# Patient Record
Sex: FEMALE | Race: BLACK OR AFRICAN AMERICAN | NOT HISPANIC OR LATINO | Employment: FULL TIME | URBAN - METROPOLITAN AREA
[De-identification: names, ages, dates, MRNs, and addresses within clinical notes are randomized per-mention and may not be internally consistent; named-entity substitution may affect disease eponyms.]

---

## 2022-04-11 ENCOUNTER — HISTORICAL (OUTPATIENT)
Dept: ADMINISTRATIVE | Facility: HOSPITAL | Age: 22
End: 2022-04-11

## 2022-04-28 VITALS
HEIGHT: 60 IN | SYSTOLIC BLOOD PRESSURE: 103 MMHG | BODY MASS INDEX: 24.68 KG/M2 | DIASTOLIC BLOOD PRESSURE: 69 MMHG | OXYGEN SATURATION: 98 % | WEIGHT: 125.69 LBS

## 2022-11-22 ENCOUNTER — HOSPITAL ENCOUNTER (EMERGENCY)
Facility: HOSPITAL | Age: 22
Discharge: HOME | End: 2022-11-22
Attending: STUDENT IN AN ORGANIZED HEALTH CARE EDUCATION/TRAINING PROGRAM
Payer: MEDICAID

## 2022-11-22 VITALS
DIASTOLIC BLOOD PRESSURE: 64 MMHG | RESPIRATION RATE: 22 BRPM | WEIGHT: 125.69 LBS | BODY MASS INDEX: 24.68 KG/M2 | HEART RATE: 112 BPM | TEMPERATURE: 99 F | OXYGEN SATURATION: 100 % | SYSTOLIC BLOOD PRESSURE: 109 MMHG | HEIGHT: 60 IN

## 2022-11-22 DIAGNOSIS — J10.1 INFLUENZA A: Primary | ICD-10-CM

## 2022-11-22 LAB
B-HCG UR QL: NEGATIVE
CTP QC/QA: YES
FLUAV AG UPPER RESP QL IA.RAPID: DETECTED
FLUBV AG UPPER RESP QL IA.RAPID: NOT DETECTED
SARS-COV-2 RNA RESP QL NAA+PROBE: NOT DETECTED
STREP A PCR (OHS): NOT DETECTED

## 2022-11-22 PROCEDURE — 0240U COVID/FLU A&B PCR: CPT | Performed by: PHYSICIAN ASSISTANT

## 2022-11-22 PROCEDURE — 99284 EMERGENCY DEPT VISIT MOD MDM: CPT | Mod: 25

## 2022-11-22 PROCEDURE — 81025 URINE PREGNANCY TEST: CPT | Performed by: PHYSICIAN ASSISTANT

## 2022-11-22 PROCEDURE — 87651 STREP A DNA AMP PROBE: CPT | Performed by: PHYSICIAN ASSISTANT

## 2022-11-22 RX ORDER — PROMETHAZINE HYDROCHLORIDE AND DEXTROMETHORPHAN HYDROBROMIDE 6.25; 15 MG/5ML; MG/5ML
5 SYRUP ORAL EVERY 6 HOURS PRN
Qty: 118 ML | Refills: 0 | OUTPATIENT
Start: 2022-11-22 | End: 2023-01-30

## 2022-11-22 NOTE — ED PROVIDER NOTES
Encounter Date: 11/22/2022     History     Chief Complaint   Patient presents with    Sore Throat     PT W CO SORE THROAT X 3 DAYS.  CO PAIN W SWALLOWING.  NO EATING DUE TO NAUSEA.      Patient with no known pmhx presents today c/o sore throat, productive cough, and nausea for 4 days. Also endorses chills. Throat pain is worse with swallowing. Patient says cough is very productive.     The history is provided by the patient. No  was used.   Cough  The current episode started several days ago. The problem occurs constantly. The problem has been unchanged. The cough is Productive of sputum. There has been no fever. Associated symptoms include chills and sore throat. Pertinent negatives include no chest pain, no sweats, no weight loss, no ear congestion, no ear pain, no headaches, no rhinorrhea, no myalgias, no shortness of breath, no wheezing and no eye redness. She has tried nothing for the symptoms. Her past medical history does not include bronchitis, pneumonia, bronchiectasis, COPD, emphysema or asthma.   Review of patient's allergies indicates:  No Known Allergies  History reviewed. No pertinent past medical history.  History reviewed. No pertinent surgical history.  History reviewed. No pertinent family history.  Social History     Tobacco Use    Smoking status: Every Day     Types: Cigarettes, Vaping with nicotine    Smokeless tobacco: Never     Review of Systems   Constitutional:  Positive for chills. Negative for fever and weight loss.   HENT:  Positive for sore throat. Negative for congestion, ear discharge, ear pain, postnasal drip, rhinorrhea, sinus pressure and sinus pain.    Eyes:  Negative for redness.   Respiratory:  Positive for cough. Negative for chest tightness, shortness of breath and wheezing.    Cardiovascular:  Negative for chest pain.   Gastrointestinal:  Positive for nausea. Negative for abdominal pain, constipation, diarrhea and vomiting.   Genitourinary:  Negative for  dysuria, flank pain and hematuria.   Musculoskeletal:  Negative for arthralgias and myalgias.   Skin:  Negative for rash.   Neurological:  Negative for syncope, light-headedness and headaches.     Physical Exam     Initial Vitals [11/22/22 1019]   BP Pulse Resp Temp SpO2   109/64 (!) 112 (!) 22 99 °F (37.2 °C) 100 %      MAP       --         Physical Exam    Vitals reviewed.  Constitutional: She appears well-developed and well-nourished. She is not diaphoretic. No distress.   HENT:   Head: Normocephalic and atraumatic.   Mouth/Throat: Oropharynx is clear and moist. No oropharyngeal exudate.   Mild pharyngeal and tonsillar erythema.    Eyes: Conjunctivae and EOM are normal.   Neck: Neck supple.   Normal range of motion.  Cardiovascular:  Normal rate, regular rhythm, normal heart sounds and intact distal pulses.           Pulmonary/Chest: Breath sounds normal. No respiratory distress. She has no wheezes. She has no rhonchi. She has no rales. She exhibits no tenderness.   Abdominal: Abdomen is soft. Bowel sounds are normal. She exhibits no distension. There is no abdominal tenderness. There is no rebound and no guarding.   Musculoskeletal:         General: No edema.      Cervical back: Normal range of motion and neck supple.     Neurological: She is alert and oriented to person, place, and time. GCS score is 15. GCS eye subscore is 4. GCS verbal subscore is 5. GCS motor subscore is 6.   Skin: Skin is warm. Capillary refill takes less than 2 seconds.   Psychiatric: She has a normal mood and affect.     ED Course   Procedures  Labs Reviewed   COVID/FLU A&B PCR - Abnormal; Notable for the following components:       Result Value    Influenza A PCR Detected (*)     All other components within normal limits    Narrative:     The Xpert Xpress SARS-CoV-2/FLU/RSV plus is a rapid, multiplexed real-time PCR test intended for the simultaneous qualitative detection and differentiation of SARS-CoV-2, Influenza A, Influenza B, and  respiratory syncytial virus (RSV) viral RNA in either nasopharyngeal swab or nasal swab specimens.         STREP GROUP A BY PCR - Normal    Narrative:     The Xpert Xpress Strep A test is a rapid, qualitative in vitro diagnostic test for the detection of Streptococcus pyogenes (Group A ß-hemolytic Streptococcus, Strep A) in throat swab specimens from patients with signs and symptoms of pharyngitis.     POCT URINE PREGNANCY          Imaging Results              X-Ray Chest PA And Lateral (Final result)  Result time 11/22/22 12:31:22      Final result by Cooper Landon MD (11/22/22 12:31:22)                   Impression:      No acute cardiopulmonary abnormality.      Electronically signed by: Cooper Landon  Date:    11/22/2022  Time:    12:31               Narrative:    EXAMINATION:  XR CHEST PA AND LATERAL    CLINICAL HISTORY:  cough;    COMPARISON:  No priors    FINDINGS:  PA and lateral views of the chest were obtained. Heart and mediastinum within normal limits. The lungs are clear. No pneumothorax or significant effusion.                                       Medications - No data to display        APC / Resident Notes:   I was not physically present during the history, exam or disposition of this patient. I was available at all times for consultation. (Zmora)               Clinical Impression:   Final diagnoses:  [J10.1] Influenza A (Primary)      ED Disposition Condition    Discharge Stable          ED Prescriptions       Medication Sig Dispense Start Date End Date Auth. Provider    promethazine-dextromethorphan (PROMETHAZINE-DM) 6.25-15 mg/5 mL Syrp Take 5 mLs by mouth every 6 (six) hours as needed (cough). 118 mL 11/22/2022 -- JUAN DANIEL Dodd          Follow-up Information       Follow up With Specialties Details Why Contact Info    [facility] - Emergency Dept Emergency Medicine  If symptoms worsen return to ED immediately [address] [facility] [address] [phone]     Primary Care Provider within 1-2 days  Go in 1 day               JUAN DANIEL Dodd  11/22/22 1241       Washington Robles MD  11/22/22 1937

## 2022-12-24 ENCOUNTER — HOSPITAL ENCOUNTER (EMERGENCY)
Facility: HOSPITAL | Age: 22
Discharge: HOME | End: 2022-12-24
Attending: STUDENT IN AN ORGANIZED HEALTH CARE EDUCATION/TRAINING PROGRAM
Payer: MEDICAID

## 2022-12-24 VITALS
HEART RATE: 79 BPM | HEIGHT: 60 IN | TEMPERATURE: 99 F | RESPIRATION RATE: 18 BRPM | DIASTOLIC BLOOD PRESSURE: 75 MMHG | BODY MASS INDEX: 24.97 KG/M2 | OXYGEN SATURATION: 100 % | SYSTOLIC BLOOD PRESSURE: 116 MMHG | WEIGHT: 127.19 LBS

## 2022-12-24 DIAGNOSIS — R05.9 COUGH: ICD-10-CM

## 2022-12-24 DIAGNOSIS — J02.0 STREP PHARYNGITIS: Primary | ICD-10-CM

## 2022-12-24 LAB
FLUAV AG UPPER RESP QL IA.RAPID: NOT DETECTED
FLUBV AG UPPER RESP QL IA.RAPID: NOT DETECTED
RSV A 5' UTR RNA NPH QL NAA+PROBE: NOT DETECTED
SARS-COV-2 RNA RESP QL NAA+PROBE: NOT DETECTED
STREP A PCR (OHS): DETECTED

## 2022-12-24 PROCEDURE — 87651 STREP A DNA AMP PROBE: CPT | Performed by: STUDENT IN AN ORGANIZED HEALTH CARE EDUCATION/TRAINING PROGRAM

## 2022-12-24 PROCEDURE — 63600175 PHARM REV CODE 636 W HCPCS: Performed by: STUDENT IN AN ORGANIZED HEALTH CARE EDUCATION/TRAINING PROGRAM

## 2022-12-24 PROCEDURE — 0241U COVID/RSV/FLU A&B PCR: CPT | Performed by: STUDENT IN AN ORGANIZED HEALTH CARE EDUCATION/TRAINING PROGRAM

## 2022-12-24 PROCEDURE — 96372 THER/PROPH/DIAG INJ SC/IM: CPT | Performed by: STUDENT IN AN ORGANIZED HEALTH CARE EDUCATION/TRAINING PROGRAM

## 2022-12-24 PROCEDURE — 99284 EMERGENCY DEPT VISIT MOD MDM: CPT | Mod: 25

## 2022-12-24 RX ORDER — BENZONATATE 100 MG/1
200 CAPSULE ORAL 3 TIMES DAILY PRN
Qty: 20 CAPSULE | Refills: 0 | Status: SHIPPED | OUTPATIENT
Start: 2022-12-24 | End: 2023-01-03

## 2022-12-24 RX ORDER — AZELASTINE 1 MG/ML
2 SPRAY, METERED NASAL 2 TIMES DAILY
Qty: 30 ML | Refills: 0 | OUTPATIENT
Start: 2022-12-24 | End: 2023-01-30

## 2022-12-24 RX ORDER — METHYLPREDNISOLONE SOD SUCC 125 MG
125 VIAL (EA) INJECTION
Status: COMPLETED | OUTPATIENT
Start: 2022-12-24 | End: 2022-12-24

## 2022-12-24 RX ORDER — CETIRIZINE HYDROCHLORIDE 10 MG/1
10 TABLET ORAL DAILY
Qty: 30 TABLET | Refills: 0 | OUTPATIENT
Start: 2022-12-24 | End: 2023-01-30

## 2022-12-24 RX ADMIN — PENICILLIN G BENZATHINE 1.2 MILLION UNITS: 1200000 INJECTION, SUSPENSION INTRAMUSCULAR at 12:12

## 2022-12-24 RX ADMIN — METHYLPREDNISOLONE SODIUM SUCCINATE 125 MG: 125 INJECTION, POWDER, FOR SOLUTION INTRAMUSCULAR; INTRAVENOUS at 12:12

## 2022-12-24 NOTE — Clinical Note
"Ebonie Craigprakash Fall was seen and treated in our emergency department on 12/24/2022.  She may return to work on 12/27/2022.       If you have any questions or concerns, please don't hesitate to call.      Washington Robles MD"

## 2022-12-24 NOTE — ED PROVIDER NOTES
Encounter Date: 12/24/2022       History     Chief Complaint   Patient presents with    Sore Throat    Cough     C/o sore throat, productive cough x 2 days. Recently had flu     22-year-old female presents to ED for sore throat.  states she had the flu several weeks ago and recovered from it.  states since yesterday she is had a progressively sore throat and intermittent grossly nonproductive cough.  She denies any chest pain or pressure.  No pleuritic component.  Does report some mild bilateral ear discomfort when she coughs.  Reports nasal congestion.  Denies any tongue elevation, no voice changes, no inability to tolerate secretions and no respiratory compromise.  Reports chills at home but no fevers.  No myalgias. denies any other complaints or concerns at this time.    Review of patient's allergies indicates:  No Known Allergies  History reviewed. No pertinent past medical history.  History reviewed. No pertinent surgical history.  History reviewed. No pertinent family history.  Social History     Tobacco Use    Smoking status: Every Day     Types: Cigarettes, Vaping with nicotine    Smokeless tobacco: Never   Substance Use Topics    Alcohol use: Never    Drug use: Never     Review of Systems   Constitutional:  Negative for chills, diaphoresis and fever.   HENT:  Positive for ear pain, postnasal drip, rhinorrhea and sore throat. Negative for congestion, ear discharge, facial swelling, sinus pressure, sinus pain, tinnitus, trouble swallowing and voice change.    Eyes:  Negative for pain, discharge and itching.   Respiratory:  Positive for cough. Negative for chest tightness, shortness of breath, wheezing and stridor.    Cardiovascular:  Negative for chest pain and palpitations.   Gastrointestinal:  Negative for abdominal pain, nausea and vomiting.   Genitourinary:  Negative for dysuria, flank pain and hematuria.   Musculoskeletal:  Negative for back pain and myalgias.   Skin:  Negative for color change and  rash.   Neurological:  Negative for dizziness, weakness and headaches.   Psychiatric/Behavioral:  Negative for confusion. The patient is not hyperactive.      Physical Exam     Initial Vitals [12/24/22 1026]   BP Pulse Resp Temp SpO2   98/61 97 20 99 °F (37.2 °C) 100 %      MAP       --         Physical Exam    Vitals reviewed.  Constitutional: She appears well-developed and well-nourished. She is not diaphoretic. No distress.   HENT:   Head: Normocephalic and atraumatic.   Right Ear: Tympanic membrane and ear canal normal.   Left Ear: Tympanic membrane and ear canal normal.   Nose: Mucosal edema and rhinorrhea present.   Mouth/Throat: Uvula is midline and mucous membranes are normal. No oral lesions. No uvula swelling. Oropharyngeal exudate, posterior oropharyngeal edema and posterior oropharyngeal erythema present. No tonsillar abscesses.   Eyes: Conjunctivae and EOM are normal. Pupils are equal, round, and reactive to light.   Neck: Neck supple. No tracheal deviation present.   Normal range of motion.  Cardiovascular:  Normal rate, regular rhythm, normal heart sounds and intact distal pulses.           Pulmonary/Chest: Breath sounds normal. No respiratory distress.   Abdominal: Abdomen is soft. There is no abdominal tenderness. There is no rebound and no guarding.   Musculoskeletal:         General: Normal range of motion.      Cervical back: Normal range of motion and neck supple.     Neurological: She is alert and oriented to person, place, and time. She has normal strength. GCS score is 15. GCS eye subscore is 4. GCS verbal subscore is 5. GCS motor subscore is 6.   Skin: Skin is warm and dry. Capillary refill takes less than 2 seconds. No rash noted.   Psychiatric: She has a normal mood and affect. Her behavior is normal. Judgment and thought content normal.       ED Course   Procedures  Labs Reviewed   STREP GROUP A BY PCR - Abnormal; Notable for the following components:       Result Value    STREP A PCR  ([facility]) Detected (*)     All other components within normal limits    Narrative:     The Xpert Xpress Strep A test is a rapid, qualitative in vitro diagnostic test for the detection of Streptococcus pyogenes (Group A ß-hemolytic Streptococcus, Strep A) in throat swab specimens from patients with signs and symptoms of pharyngitis.     COVID/RSV/FLU A&B PCR - Normal    Narrative:     The Xpert Xpress SARS-CoV-2/FLU/RSV plus is a rapid, multiplexed real-time PCR test intended for the simultaneous qualitative detection and differentiation of SARS-CoV-2, Influenza A, Influenza B, and respiratory syncytial virus (RSV) viral RNA in either nasopharyngeal swab or nasal swab specimens.                Imaging Results              X-Ray Chest PA And Lateral (Final result)  Result time 12/24/22 11:38:47      Final result by Shalonda Camara MD (12/24/22 11:38:47)                   Impression:      No acute cardiopulmonary abnormality.      Electronically signed by: Shalonda Camara  Date:    12/24/2022  Time:    11:38               Narrative:    EXAMINATION:  XR CHEST PA AND LATERAL    CLINICAL HISTORY:  Cough, unspecified    TECHNIQUE:  Two views of the chest    COMPARISON:  11/22/2022    FINDINGS:  LINES AND TUBES: None    MEDIASTINUM AND PABLO: The cardiac silhouette is normal.    LUNGS: No lobar consolidation. No edema.    PLEURA:No pleural effusion. No pneumothorax.    BONES: No acute osseous abnormality.                                       Medications   penicillin G benzathine (BICILLIN LA) injection 1.2 Million Units (1.2 Million Units Intramuscular Given 12/24/22 1220)   methylPREDNISolone sodium succinate injection 125 mg (125 mg Intramuscular Given 12/24/22 1219)     Medical Decision Making:   Clinical Tests:   Lab Tests: Reviewed and Ordered  Radiological Study: Reviewed and Ordered  ED Management:  21 yo female with strep several weeks ago presents for recurrent sore through and cough. In department VSS. NAD. Exam  showed mildly erythematous throat w/o shift or voice changes. Otherwise lungs clear to ascultation and does have runny nose. Comprehensive HEENT exam benign. Viral panel negative however +strep. At this time given steroids and abx for strep. Also prescribed medication for decongestant. Pt to f/u closely in OP setting and given strict return precautions. D/c stable. (Margaret)                         Clinical Impression:   Final diagnoses:  [R05.9] Cough  [J02.0] Strep pharyngitis (Primary)        ED Disposition Condition    Discharge Stable          ED Prescriptions       Medication Sig Dispense Start Date End Date Auth. Provider    benzonatate (TESSALON) 100 MG capsule () Take 2 capsules (200 mg total) by mouth 3 (three) times daily as needed for Cough. 20 capsule 2022 1/3/2023 Washington Robles MD    cetirizine (ZYRTEC) 10 MG tablet Take 1 tablet (10 mg total) by mouth once daily. 30 tablet 2022 Washington Robles MD    azelastine (ASTELIN) 137 mcg (0.1 %) nasal spray 2 sprays (274 mcg total) by Nasal route 2 (two) times daily. 30 mL 2022 Washington Robles MD          Follow-up Information       Follow up With Specialties Details Why Contact Info    [facility] - Emergency Dept Emergency Medicine  As needed, If symptoms worsen [address] [facility] [address] [phone]    Primary  Schedule an appointment as soon as possible for a visit in 3 days               Washington Robles MD  [phone]

## 2023-01-30 ENCOUNTER — HOSPITAL ENCOUNTER (EMERGENCY)
Facility: HOSPITAL | Age: 23
Discharge: HOME | End: 2023-01-30
Attending: FAMILY MEDICINE
Payer: MEDICAID

## 2023-01-30 VITALS
HEART RATE: 64 BPM | OXYGEN SATURATION: 100 % | SYSTOLIC BLOOD PRESSURE: 105 MMHG | HEIGHT: 60 IN | RESPIRATION RATE: 18 BRPM | TEMPERATURE: 98 F | DIASTOLIC BLOOD PRESSURE: 65 MMHG | WEIGHT: 124.25 LBS | BODY MASS INDEX: 24.39 KG/M2

## 2023-01-30 DIAGNOSIS — Z33.1 PREGNANCY AS INCIDENTAL FINDING: ICD-10-CM

## 2023-01-30 DIAGNOSIS — R11.2 NAUSEA AND VOMITING, UNSPECIFIED VOMITING TYPE: Primary | ICD-10-CM

## 2023-01-30 LAB
B-HCG UR QL: POSITIVE
CTP QC/QA: YES

## 2023-01-30 PROCEDURE — 81025 URINE PREGNANCY TEST: CPT | Performed by: PHYSICIAN ASSISTANT

## 2023-01-30 PROCEDURE — 99282 EMERGENCY DEPT VISIT SF MDM: CPT | Mod: 25

## 2023-01-30 RX ORDER — FAMOTIDINE 20 MG
1 TABLET ORAL DAILY
Qty: 30 TABLET | Refills: 2 | Status: SHIPPED | OUTPATIENT
Start: 2023-01-30 | End: 2023-08-04 | Stop reason: SDUPTHER

## 2023-01-30 RX ORDER — ONDANSETRON 4 MG/1
8 TABLET, ORALLY DISINTEGRATING ORAL
Status: DISCONTINUED | OUTPATIENT
Start: 2023-01-30 | End: 2023-01-30

## 2023-01-30 NOTE — DISCHARGE INSTRUCTIONS
You may take Unisom 20 mg (doxylamine) and vitamin B6 20 mg (pyridoxine) at bedtime to help with nausea.     Eat several small meals per day.    Corinne candy and peppermints may also help with nausea.

## 2023-01-30 NOTE — Clinical Note
"Ebonie Dyson"Juan David was seen and treated in our emergency department on 1/30/2023.  She may return to work on 01/31/2023.       If you have any questions or concerns, please don't hesitate to call.      Remington PONCE    " None

## 2023-01-30 NOTE — ED PROVIDER NOTES
"Encounter Date: 1/30/2023       History     Chief Complaint   Patient presents with    Vomiting     N/v, fatigue, and abdomen feels like it's "boiling" since Saturday.      21 YO AAF in ER with complaints of upper abdominal pain, fatigue, nausea and about 5-6 episodes of bilious non bloody vomiting since Saturday. LMP 12/22/22. Denies fever, chills, chest pain, SOB, diarrhea, HA or dizziness. No other complaints.     The history is provided by the patient.   Review of patient's allergies indicates:  No Known Allergies  No past medical history on file.  No past surgical history on file.  No family history on file.  Social History     Tobacco Use    Smoking status: Every Day     Types: Cigarettes, Vaping with nicotine    Smokeless tobacco: Never   Substance Use Topics    Alcohol use: Never    Drug use: Never     Review of Systems   Constitutional:  Positive for fatigue. Negative for appetite change, chills and fever.   HENT:  Negative for congestion and sore throat.    Respiratory:  Negative for shortness of breath.    Cardiovascular:  Negative for chest pain.   Gastrointestinal:  Positive for abdominal pain, nausea and vomiting. Negative for blood in stool and diarrhea.   Genitourinary:  Negative for dysuria, pelvic pain, vaginal bleeding and vaginal discharge.   Musculoskeletal:  Negative for back pain.   Skin:  Negative for rash.   Neurological:  Negative for dizziness, weakness, light-headedness and headaches.   Hematological:  Does not bruise/bleed easily.   All other systems reviewed and are negative.    Physical Exam     Initial Vitals [01/30/23 0812]   BP Pulse Resp Temp SpO2   105/65 64 18 97.6 °F (36.4 °C) 100 %      MAP       --         Physical Exam    Nursing note and vitals reviewed.  Constitutional: She appears well-developed and well-nourished. She is not diaphoretic. No distress.   HENT:   Head: Normocephalic and atraumatic.   Mouth/Throat: Oropharynx is clear and moist.   Eyes: Conjunctivae are " normal.   Cardiovascular:  Normal rate, regular rhythm, normal heart sounds and intact distal pulses.           Pulmonary/Chest: Breath sounds normal.   Abdominal: Abdomen is soft. Bowel sounds are normal. There is abdominal tenderness (mild epigastric). There is no rebound and no guarding.   Musculoskeletal:         General: Normal range of motion.     Neurological: She is alert and oriented to person, place, and time.   Skin: Skin is warm and dry.   Psychiatric: She has a normal mood and affect.       ED Course   ED US Pelvis OB    Date/Time: 1/30/2023 9:31 AM  Performed by: JUAN DANIEL Monet  Authorized by: JUAN DANIEL Monet     Indication:  Pregnancy  Identified Structures:  Uterus sagittal and Uterus transverse  Definitive IUP:  Present  Uterine Contents:  Embryo with cardiac activity  (bpm):  105  Impression:  IUP  Charge?:  No (educational)  Labs Reviewed   POCT URINE PREGNANCY - Abnormal; Notable for the following components:       Result Value    POC Preg Test, Ur Positive (*)     All other components within normal limits          Imaging Results    None          Medications - No data to display                           Clinical Impression:   Final diagnoses:  [R11.2] Nausea and vomiting, unspecified vomiting type (Primary)  [Z33.1] Pregnancy as incidental finding        ED Disposition Condition    Discharge Stable          ED Prescriptions       Medication Sig Dispense Start Date End Date Auth. Provider    prenatal 21-iron fu-folic acid (PRENATAL COMPLETE) 14 mg iron- 400 mcg Tab Take 1 tablet by mouth once daily. 30 tablet 1/30/2023 1/30/2024 JUAN DANIEL Monet          Follow-up Information       Follow up With Specialties Details Why Contact Info    [facility] - Emergency Dept Emergency Medicine In 3 days As needed, If symptoms worsen [address] [facility] [address] [phone]    [facility]   Family Medicine Clinic for OB/prenatal care, they will call you with  an appointment [address] [place] [id number]             JUAN DANIEL Monet  01/30/23 0929

## 2023-01-30 NOTE — Clinical Note
"Ebonie Dyson"Juan David was seen and treated in our emergency department on 1/30/2023.  She may return to work on 01/31/2023.       If you have any questions or concerns, please don't hesitate to call.      Remington PONCE    "

## 2023-02-01 ENCOUNTER — HOSPITAL ENCOUNTER (EMERGENCY)
Facility: HOSPITAL | Age: 23
Discharge: HOME | End: 2023-02-01
Attending: FAMILY MEDICINE
Payer: MEDICAID

## 2023-02-01 VITALS
RESPIRATION RATE: 16 BRPM | DIASTOLIC BLOOD PRESSURE: 61 MMHG | HEART RATE: 55 BPM | SYSTOLIC BLOOD PRESSURE: 97 MMHG | OXYGEN SATURATION: 100 % | BODY MASS INDEX: 24.68 KG/M2 | HEIGHT: 60 IN | TEMPERATURE: 98 F | WEIGHT: 125.69 LBS

## 2023-02-01 DIAGNOSIS — O23.11 ACUTE CYSTITIS DURING PREGNANCY IN FIRST TRIMESTER: ICD-10-CM

## 2023-02-01 DIAGNOSIS — O21.9 NAUSEA AND VOMITING IN PREGNANCY: Primary | ICD-10-CM

## 2023-02-01 LAB
ALBUMIN SERPL-MCNC: 4.2 G/DL (ref 3.5–5)
ALBUMIN/GLOB SERPL: 1.2 RATIO (ref 1.1–2)
ALP SERPL-CCNC: 52 UNIT/L (ref 40–150)
ALT SERPL-CCNC: 7 UNIT/L (ref 0–55)
APPEARANCE UR: ABNORMAL
AST SERPL-CCNC: 12 UNIT/L (ref 5–34)
B-HCG UR QL: POSITIVE
BACTERIA #/AREA URNS AUTO: ABNORMAL /HPF
BASOPHILS # BLD AUTO: 0.02 X10(3)/MCL (ref 0–0.2)
BASOPHILS NFR BLD AUTO: 0.4 %
BILIRUB UR QL STRIP.AUTO: NEGATIVE MG/DL
BILIRUBIN DIRECT+TOT PNL SERPL-MCNC: 0.3 MG/DL
BUN SERPL-MCNC: 9.3 MG/DL (ref 7–18.7)
CALCIUM SERPL-MCNC: 9.8 MG/DL (ref 8.4–10.2)
CHLORIDE SERPL-SCNC: 104 MMOL/L (ref 98–107)
CO2 SERPL-SCNC: 23 MMOL/L (ref 22–29)
COLOR UR AUTO: YELLOW
CREAT SERPL-MCNC: 0.74 MG/DL (ref 0.55–1.02)
CTP QC/QA: YES
EOSINOPHIL # BLD AUTO: 0.04 X10(3)/MCL (ref 0–0.9)
EOSINOPHIL NFR BLD AUTO: 0.8 %
ERYTHROCYTE [DISTWIDTH] IN BLOOD BY AUTOMATED COUNT: 12.7 % (ref 11.5–17)
GFR SERPLBLD CREATININE-BSD FMLA CKD-EPI: >60 MLS/MIN/1.73/M2
GLOBULIN SER-MCNC: 3.5 GM/DL (ref 2.4–3.5)
GLUCOSE SERPL-MCNC: 92 MG/DL (ref 74–100)
GLUCOSE UR QL STRIP.AUTO: NORMAL MG/DL
HCT VFR BLD AUTO: 36.2 % (ref 37–47)
HGB BLD-MCNC: 12.2 GM/DL (ref 12–16)
HYALINE CASTS #/AREA URNS LPF: ABNORMAL /LPF
IMM GRANULOCYTES # BLD AUTO: 0.01 X10(3)/MCL (ref 0–0.04)
IMM GRANULOCYTES NFR BLD AUTO: 0.2 %
KETONES UR QL STRIP.AUTO: ABNORMAL MG/DL
LEUKOCYTE ESTERASE UR QL STRIP.AUTO: 500 UNIT/L
LYMPHOCYTES # BLD AUTO: 0.74 X10(3)/MCL (ref 0.6–4.6)
LYMPHOCYTES NFR BLD AUTO: 14.6 %
MAGNESIUM SERPL-MCNC: 1.9 MG/DL (ref 1.6–2.6)
MCH RBC QN AUTO: 29.5 PG
MCHC RBC AUTO-ENTMCNC: 33.7 MG/DL (ref 33–36)
MCV RBC AUTO: 87.4 FL (ref 80–94)
MONOCYTES # BLD AUTO: 0.43 X10(3)/MCL (ref 0.1–1.3)
MONOCYTES NFR BLD AUTO: 8.5 %
MUCOUS THREADS URNS QL MICRO: ABNORMAL /LPF
NEUTROPHILS # BLD AUTO: 3.82 X10(3)/MCL (ref 2.1–9.2)
NEUTROPHILS NFR BLD AUTO: 75.5 %
NITRITE UR QL STRIP.AUTO: NEGATIVE
NRBC BLD AUTO-RTO: 0 %
PH UR STRIP.AUTO: 7.5 [PH]
PLATELET # BLD AUTO: 271 X10(3)/MCL (ref 130–400)
PMV BLD AUTO: 9 FL (ref 7.4–10.4)
POTASSIUM SERPL-SCNC: 3.5 MMOL/L (ref 3.5–5.1)
PROT SERPL-MCNC: 7.7 GM/DL (ref 6.4–8.3)
PROT UR QL STRIP.AUTO: ABNORMAL MG/DL
RBC # BLD AUTO: 4.14 X10(6)/MCL (ref 4.2–5.4)
RBC #/AREA URNS AUTO: ABNORMAL /HPF
RBC UR QL AUTO: NEGATIVE UNIT/L
SODIUM SERPL-SCNC: 136 MMOL/L (ref 136–145)
SP GR UR STRIP.AUTO: 1.03
SQUAMOUS #/AREA URNS LPF: ABNORMAL /HPF
UROBILINOGEN UR STRIP-ACNC: NORMAL MG/DL
WBC # SPEC AUTO: 5.1 X10(3)/MCL (ref 4.5–11.5)
WBC #/AREA URNS AUTO: ABNORMAL /HPF

## 2023-02-01 PROCEDURE — 96361 HYDRATE IV INFUSION ADD-ON: CPT

## 2023-02-01 PROCEDURE — 83735 ASSAY OF MAGNESIUM: CPT | Performed by: PHYSICIAN ASSISTANT

## 2023-02-01 PROCEDURE — 81001 URINALYSIS AUTO W/SCOPE: CPT | Performed by: PHYSICIAN ASSISTANT

## 2023-02-01 PROCEDURE — 96374 THER/PROPH/DIAG INJ IV PUSH: CPT

## 2023-02-01 PROCEDURE — 81025 URINE PREGNANCY TEST: CPT | Performed by: PHYSICIAN ASSISTANT

## 2023-02-01 PROCEDURE — 25000003 PHARM REV CODE 250: Performed by: PHYSICIAN ASSISTANT

## 2023-02-01 PROCEDURE — 80053 COMPREHEN METABOLIC PANEL: CPT | Performed by: PHYSICIAN ASSISTANT

## 2023-02-01 PROCEDURE — 99284 EMERGENCY DEPT VISIT MOD MDM: CPT | Mod: 25

## 2023-02-01 PROCEDURE — 63600175 PHARM REV CODE 636 W HCPCS: Performed by: PHYSICIAN ASSISTANT

## 2023-02-01 PROCEDURE — 85025 COMPLETE CBC W/AUTO DIFF WBC: CPT | Performed by: PHYSICIAN ASSISTANT

## 2023-02-01 PROCEDURE — 87088 URINE BACTERIA CULTURE: CPT | Performed by: PHYSICIAN ASSISTANT

## 2023-02-01 RX ORDER — METOCLOPRAMIDE HYDROCHLORIDE 5 MG/ML
5 INJECTION INTRAMUSCULAR; INTRAVENOUS
Status: COMPLETED | OUTPATIENT
Start: 2023-02-01 | End: 2023-02-01

## 2023-02-01 RX ORDER — CEPHALEXIN 500 MG/1
500 CAPSULE ORAL EVERY 12 HOURS
Qty: 10 CAPSULE | Refills: 0 | Status: SHIPPED | OUTPATIENT
Start: 2023-02-01 | End: 2023-02-06

## 2023-02-01 RX ORDER — SODIUM CHLORIDE 9 MG/ML
1000 INJECTION, SOLUTION INTRAVENOUS
Status: COMPLETED | OUTPATIENT
Start: 2023-02-01 | End: 2023-02-01

## 2023-02-01 RX ADMIN — METOCLOPRAMIDE 5 MG: 5 INJECTION, SOLUTION INTRAMUSCULAR; INTRAVENOUS at 09:02

## 2023-02-01 RX ADMIN — SODIUM CHLORIDE 1000 ML: 9 INJECTION, SOLUTION INTRAVENOUS at 09:02

## 2023-02-01 NOTE — DISCHARGE INSTRUCTIONS
A referral has been sent at your last ED visit to OB. It is currently in process. You will be called with an appt.  Take OTC B6 and Unisom (together) for nausea/vomiting during pregnancy.   Return to the ED with any worsening symptoms such as vaginal bleeding, abdominal pain, fever, fainting, etc.

## 2023-02-01 NOTE — Clinical Note
"Ebonie Craigprakash Fall was seen and treated in our emergency department on 2/1/2023.  She may return to work on 02/02/2023.       If you have any questions or concerns, please don't hesitate to call.      Kelsi Aldridge PA-C"

## 2023-02-01 NOTE — ED PROVIDER NOTES
Encounter Date: 2/1/2023       History     Chief Complaint   Patient presents with    Emesis During Pregnancy     PT REPORTS 5 WK OB CO NV X 3 DAYS.  CO ABD PAIN, NO VAG BLEEDING OR DISCHARGE.  NO DYSURIA . VSS.      Ebonie Fall is a 22 y.o. female who presents to the ED with complaints of nausea, vomiting, and fatigue that started 5 day(s) ago. Patients LMP was 12/22/22 and she is currently 5 weeks pregnant. She has not yet followed up with OB. Reports multiple episodes of vomiting. She denies vaginal bleeding, vaginal discharge, abdominal pain, cramping, syncope, headache, dizziness.        The history is provided by the patient. No  was used.   Review of patient's allergies indicates:  No Known Allergies  Past Medical History:   Diagnosis Date    Asthma      History reviewed. No pertinent surgical history.  History reviewed. No pertinent family history.  Social History     Tobacco Use    Smoking status: Every Day     Types: Cigarettes, Vaping with nicotine    Smokeless tobacco: Never   Substance Use Topics    Alcohol use: Never    Drug use: Never     Review of Systems   Constitutional:  Positive for fatigue. Negative for chills and fever.   HENT:  Negative for congestion, ear pain, sinus pain and sore throat.    Eyes:  Negative for pain.   Respiratory:  Negative for cough, chest tightness and shortness of breath.    Cardiovascular:  Negative for chest pain.   Gastrointestinal:  Positive for nausea and vomiting. Negative for abdominal pain, constipation and diarrhea.   Genitourinary:  Negative for dysuria, flank pain, pelvic pain, urgency, vaginal bleeding, vaginal discharge and vaginal pain.   Musculoskeletal:  Negative for back pain and joint swelling.   Skin:  Negative for color change and rash.   Neurological:  Negative for dizziness, weakness and headaches.   Psychiatric/Behavioral:  Negative for behavioral problems and confusion.      Physical Exam     Initial Vitals [02/01/23  0841]   BP Pulse Resp Temp SpO2   107/71 61 16 98.4 °F (36.9 °C) 100 %      MAP       --         Physical Exam    Nursing note and vitals reviewed.  Constitutional: She appears well-developed and well-nourished.   HENT:   Head: Normocephalic and atraumatic.   Nose: Nose normal.   Eyes: EOM are normal. Pupils are equal, round, and reactive to light.   Neck: Neck supple. No thyromegaly present. No JVD present.   Normal range of motion.  Cardiovascular:  Normal rate, regular rhythm, normal heart sounds and intact distal pulses.           No murmur heard.  Pulmonary/Chest: Breath sounds normal. No respiratory distress. She has no wheezes. She has no rhonchi. She has no rales. She exhibits no tenderness.   Abdominal: Abdomen is soft. Bowel sounds are normal. She exhibits no distension. There is no abdominal tenderness. There is no rebound and no guarding.   Musculoskeletal:         General: No tenderness or edema. Normal range of motion.      Cervical back: Normal range of motion and neck supple.     Lymphadenopathy:     She has no cervical adenopathy.   Neurological: She is alert and oriented to person, place, and time.   Skin: Skin is warm and dry. Capillary refill takes less than 2 seconds.   Psychiatric: She has a normal mood and affect. Thought content normal.       ED Course   Procedures  Labs Reviewed   URINALYSIS, REFLEX TO URINE CULTURE - Abnormal; Notable for the following components:       Result Value    Appearance, UA Turbid (*)     Protein, UA 1+ (*)     Ketones, UA 1+ (*)     Leukocyte Esterase,  (*)     WBC, UA 11-20 (*)     Bacteria, UA Trace (*)     Squamous Epithelial Cells, UA Moderate (*)     Mucous, UA Moderate (*)     All other components within normal limits   CBC WITH DIFFERENTIAL - Abnormal; Notable for the following components:    RBC 4.14 (*)     Hct 36.2 (*)     All other components within normal limits   POCT URINE PREGNANCY - Abnormal; Notable for the following components:    POC Preg  Test, Ur Positive (*)     All other components within normal limits   MAGNESIUM - Normal   CULTURE, URINE   CBC W/ AUTO DIFFERENTIAL    Narrative:     The following orders were created for panel order CBC auto differential.  Procedure                               Abnormality         Status                     ---------                               -----------         ------                     CBC with Differential[[id number]]        Abnormal            Final result                 Please view results for these tests on the individual orders.   COMPREHENSIVE METABOLIC PANEL   EXTRA TUBES    Narrative:     The following orders were created for panel order EXTRA TUBES.  Procedure                               Abnormality         Status                     ---------                               -----------         ------                     Light Blue Top Hold[[id number]]                              In process                 Gold Top Hold[[id number]]                                    In process                 Pink Top Hold[[id number]]                                    In process                   Please view results for these tests on the individual orders.   LIGHT BLUE TOP HOLD   GOLD TOP HOLD   PINK TOP HOLD          Imaging Results    None          Medications   0.9%  NaCl infusion (0 mLs Intravenous Stopped 2/1/23 1005)   metoclopramide HCl injection 5 mg (5 mg Intravenous Given 2/1/23 0916)                 ED Course as of 02/01/23 1028   Wed Feb 01, 2023   1023 Reassessed patient at this time. She is sitting comfortably in the exam bed. She has not had any episodes of vomiting since being here and reports improvement with nausea after Reglan. Passed PO challenge. Discussed lab results, UA, diagnosis, and treatment plan. Offered patient to repeat UA as I believe this is a dirty catch in the absence of any symptoms but patient does not want to repeat. I will treat for UTI and send for culture. Recommended  Vitamin B6 and Unisom OTC for nausea and vomiting with pregnancy. Referral was sent to Family Medicine (OB) at her last visit. I have confirmed the referral is in process. Strict return precautions given. She verbalized understanding to the treatment plan. Stable for discharge.  [VJ]      ED Course User Index  [VJ] Kelsi Aldridge PA-C                 Clinical Impression:   Final diagnoses:  [O21.9] Nausea and vomiting in pregnancy (Primary)  [O23.11] Acute cystitis during pregnancy in first trimester        ED Disposition Condition    Discharge Stable          ED Prescriptions       Medication Sig Dispense Start Date End Date Auth. Provider    cephALEXin (KEFLEX) 500 MG capsule Take 1 capsule (500 mg total) by mouth every 12 (twelve) hours. for 5 days 10 capsule 2/1/2023 2/6/2023 Kelsi Aldridge PA-C          Follow-up Information       Follow up With Specialties Details Why Contact Info    [facility]  In 1 week  [facility] [facility] [address]    [facility] Emergency Medicine In 3 days As needed, If symptoms worsen [facility] [facility] [address] [phone]             Kelsi Aldridge PA-C  02/01/23 8214       Kelsi Aldridge PA-C  02/01/23 3120

## 2023-02-03 LAB — BACTERIA UR CULT: NORMAL

## 2023-02-08 ENCOUNTER — TELEPHONE (OUTPATIENT)
Dept: OBGYN | Facility: CLINIC | Age: 23
End: 2023-02-08
Payer: MEDICAID

## 2023-03-01 ENCOUNTER — HOSPITAL ENCOUNTER (EMERGENCY)
Facility: HOSPITAL | Age: 23
Discharge: HOME | End: 2023-03-01
Attending: STUDENT IN AN ORGANIZED HEALTH CARE EDUCATION/TRAINING PROGRAM
Payer: MEDICAID

## 2023-03-01 VITALS
HEIGHT: 65 IN | TEMPERATURE: 98 F | RESPIRATION RATE: 18 BRPM | SYSTOLIC BLOOD PRESSURE: 103 MMHG | DIASTOLIC BLOOD PRESSURE: 56 MMHG | OXYGEN SATURATION: 100 % | HEART RATE: 64 BPM | WEIGHT: 125.69 LBS | BODY MASS INDEX: 20.94 KG/M2

## 2023-03-01 DIAGNOSIS — R30.0 DYSURIA: ICD-10-CM

## 2023-03-01 DIAGNOSIS — B37.9: Primary | ICD-10-CM

## 2023-03-01 DIAGNOSIS — J31.0 RHINITIS, UNSPECIFIED TYPE: ICD-10-CM

## 2023-03-01 LAB
APPEARANCE UR: ABNORMAL
BACTERIA #/AREA URNS AUTO: ABNORMAL /HPF
BILIRUB UR QL STRIP.AUTO: NEGATIVE MG/DL
COLOR UR AUTO: ABNORMAL
GLUCOSE UR QL STRIP.AUTO: NORMAL MG/DL
HYALINE CASTS #/AREA URNS LPF: ABNORMAL /LPF
KETONES UR QL STRIP.AUTO: NEGATIVE MG/DL
LEUKOCYTE ESTERASE UR QL STRIP.AUTO: NEGATIVE UNIT/L
MUCOUS THREADS URNS QL MICRO: ABNORMAL /LPF
NITRITE UR QL STRIP.AUTO: NEGATIVE
PH UR STRIP.AUTO: 7.5 [PH]
PROT UR QL STRIP.AUTO: NEGATIVE MG/DL
RBC #/AREA URNS AUTO: ABNORMAL /HPF
RBC UR QL AUTO: NEGATIVE UNIT/L
SP GR UR STRIP.AUTO: 1.02
SQUAMOUS #/AREA URNS LPF: ABNORMAL /HPF
UROBILINOGEN UR STRIP-ACNC: NORMAL MG/DL
WBC #/AREA URNS AUTO: ABNORMAL /HPF
YEAST BUDDING URNS QL: ABNORMAL /HPF

## 2023-03-01 PROCEDURE — 99284 EMERGENCY DEPT VISIT MOD MDM: CPT

## 2023-03-01 PROCEDURE — 81001 URINALYSIS AUTO W/SCOPE: CPT | Performed by: NURSE PRACTITIONER

## 2023-03-01 RX ORDER — ASPIRIN 325 MG
1 TABLET, DELAYED RELEASE (ENTERIC COATED) ORAL NIGHTLY
Qty: 45 G | Refills: 0 | Status: SHIPPED | OUTPATIENT
Start: 2023-03-01 | End: 2023-03-08

## 2023-03-01 RX ORDER — LORATADINE 10 MG/1
10 TABLET ORAL DAILY
Qty: 14 TABLET | Refills: 0 | Status: SHIPPED | OUTPATIENT
Start: 2023-03-01 | End: 2023-05-27 | Stop reason: SDUPTHER

## 2023-03-01 NOTE — DISCHARGE INSTRUCTIONS
Take medication as prescribed.  Follow up with your primary care physician in 3-5 days for follow up evaluation.  Follow up with your OB physician this week for further evaluation.

## 2023-03-01 NOTE — ED PROVIDER NOTES
Encounter Date: 3/1/2023       History     Chief Complaint   Patient presents with    Dysuria     Pt recently diagnosed with a UTI and pt still has dysuria.      Pt is a 22 y.o. female who presents to the [facility] ED complaining of burning with urination x 3 days. Reports being treated for a UTI in early February and symptoms did improve until just a few days ago. Denies vaginal bleeding or discharge. Pt is currently pregnant. Has been taking prenatal vitamins as directed. Denies pelvic pain, chest pain, SOB, weakness, dizziness, nausea/vomiting, abdominal pain, or fever. Pt additionally reports mild runny nose and sore throat. Reports she recently stopped smoking and is unsure if that is the cause of her symptoms.     Review of patient's allergies indicates:  No Known Allergies  Past Medical History:   Diagnosis Date    Asthma      No past surgical history on file.  No family history on file.  Social History     Tobacco Use    Smoking status: Every Day     Types: Cigarettes, Vaping with nicotine    Smokeless tobacco: Never   Substance Use Topics    Alcohol use: Never    Drug use: Never     Review of Systems   Constitutional:  Negative for chills, diaphoresis, fatigue and fever.   HENT:  Negative for facial swelling, rhinorrhea, sinus pressure, sinus pain, sore throat and trouble swallowing.    Respiratory:  Negative for cough, chest tightness, shortness of breath and wheezing.    Cardiovascular:  Negative for chest pain, palpitations and leg swelling.   Gastrointestinal:  Negative for abdominal pain, diarrhea, nausea and vomiting.   Genitourinary:  Positive for dysuria. Negative for flank pain, frequency, hematuria, urgency, vaginal bleeding and vaginal discharge.   Musculoskeletal:  Negative for arthralgias, back pain, joint swelling and myalgias.   Skin:  Negative for color change and rash.   Neurological:  Negative for dizziness, syncope, weakness and light-headedness.   Hematological:  Does not bruise/bleed easily.    All other systems reviewed and are negative.    Physical Exam     Initial Vitals [03/01/23 0843]   BP Pulse Resp Temp SpO2   (!) 103/56 64 18 98.2 °F (36.8 °C) 100 %      MAP       --         Physical Exam    Nursing note and vitals reviewed.  Constitutional: She appears well-developed and well-nourished.   HENT:   Head: Normocephalic and atraumatic.   Nose: Nose normal.   Mouth/Throat: Oropharynx is clear and moist.   Eyes: Conjunctivae and EOM are normal. Pupils are equal, round, and reactive to light.   Neck: Neck supple.   Normal range of motion.  Cardiovascular:  Normal rate, regular rhythm, normal heart sounds and intact distal pulses.           Pulmonary/Chest: Effort normal and breath sounds normal. No respiratory distress. She has no wheezes. She has no rhonchi. She has no rales. She exhibits no tenderness.   Abdominal: Abdomen is soft and flat. Bowel sounds are normal. She exhibits no distension. There is no abdominal tenderness. There is no rebound, no guarding, no tenderness at McBurney's point and negative Acosta's sign. negative psoas sign  Musculoskeletal:         General: Normal range of motion.      Cervical back: Normal range of motion and neck supple.     Neurological: She is alert and oriented to person, place, and time. She has normal strength and normal reflexes.   Skin: Skin is warm and dry. Capillary refill takes less than 2 seconds.   Psychiatric: She has a normal mood and affect. Her speech is normal and behavior is normal. Judgment and thought content normal.       ED Course   Procedures  Labs Reviewed   URINALYSIS, REFLEX TO URINE CULTURE - Abnormal; Notable for the following components:       Result Value    Appearance, UA Turbid (*)     Budding Yeast, UA Few (*)     Squamous Epithelial Cells, UA Few (*)     Mucous, UA Trace (*)     All other components within normal limits          Imaging Results    None          Medications - No data to display  Medical Decision Making:    Differential Diagnosis:   UTI  Candida infection  Clinical Tests:   Lab Tests: Reviewed and Ordered  ED Management:  9:41 AM Reassessed patient at this time. Due to findings of budding yeast as well as squamous epithelial cells in pt urine, I am concerned for candida infection to vagina. I will place pt on topical medication for issue secondary to pregnancy. She will follow up with her OB physician for further evaluation  and her urinalysis will be cultured to rule out candida or bacterial growth. Discussed with patient all pertinent ED information and results. Discussed diagnosis and treatment plan with patient. Follow up instructions and return to ED instruction have been given. All questions and concerns were addressed at this time. Patient voices understanding of information and instructions. Patient is comfortable with plan and discharge. Patient is stable for discharge.        APC / Resident Notes:   I was not physically present during the history, exam or disposition of this patient. I was available at all times for consultation. (Margaret)                   Clinical Impression:   Final diagnoses:  [B37.9] Budding yeast detected (Primary)  [R30.0] Dysuria  [J31.0] Rhinitis, unspecified type        ED Disposition Condition    Discharge Stable          ED Prescriptions       Medication Sig Dispense Start Date End Date Auth. Provider    clotrimazole (LOTRIMIN) 1 % Crea Place 1 suppository (1 applicator total) vaginally nightly. for 7 days 45 g 3/1/2023 3/8/2023 ILIANA Cummings Jr.    loratadine (CLARITIN) 10 mg tablet Take 1 tablet (10 mg total) by mouth once daily. for 14 days 14 tablet 3/1/2023 3/15/2023 ILIANA Cummings Jr.          Follow-up Information       Follow up With Specialties Details Why Contact Info    [facility] Emergency Medicine In 3 days As needed, If symptoms worsen [address] [facility] [address] [phone]             Cooper Padilla  , [facility]  03/01/23 0950       Washington Robles MD  03/01/23 9481

## 2023-03-06 ENCOUNTER — PATIENT OUTREACH (OUTPATIENT)
Dept: EMERGENCY MEDICINE | Facility: HOSPITAL | Age: 23
End: 2023-03-06
Payer: MEDICAID

## 2023-03-06 NOTE — PROGRESS NOTES
"Identity verified.  Pt states the burning has improved.  She states she filled and is taking the medication as prescribed.  She denies any questions about the medications prescribed or ED discharge instructions.  Pt denies any vaginal bleeding and mild "cramps."  Instructed pt if she has vaginal bleeding or severe abdominal pain to present to the ED for evaluation.  Pt has no PCP.  Educated on the benefits of having a PCP and instructed pt an appt to establish care with a PCP will be made after delivery.  Pt denies SOB and states she has no issues with asthma at present.  Educated on eating a healthy diet, drinking plenty of water, obtaining prenatal care, when/where to get medical care.  Pt states she had a dental appt in the last year.  She does not recall the provider's name.  Educated on the benefits of oral cleanings every 6 months.  Pt states he last pap smear was 3 years ago.  Patient denies any SDOH barriers at this time and she feels safe in her home.  Stressed the importance of medication compliance, keeping appointments and instructed on the use of urgent care clinic for non emergent issues unrelated to the pregnancy until PCP established.  Patient verbalized understanding to all instructions.      Follow up 4/4/2023    Appointments   PCP Visit Upcoming :  No    Appointment Date/Time :     PCP Visit Upcoming Reason :   PCP Visit Within Year :   No  PCP Visit Within Year Reason:      PCP Visit One Year Date :    Follow-Up Specialist Appt Scheduled : Yes   Type of Specialist :     OB 4/13/2023 1000  Follow-Up Lab Appt Scheduled :    Follow-Up Date :      Follow-Up Radiology Appt Scheduled :  Yes  Radiology Orders : OB Ultrasound 4/13/2023 0930    Providers Patient Visited Last Year :    Dentist    "

## 2023-03-06 NOTE — PATIENT INSTRUCTIONS
If you have any questions call Sheila [phone].         Why Should I Have My Own Doctor or Nurse Practitioner (PCP) to Take Care of Me  What is a PCP (Primary Care Provider)?    A primary care provider is a doctor or nurse practitioner who you can call for an appointment and will see you when you are sick.    You will also be seen at scheduled appointment times during the year to check on your diabetes, or high blood pressure, or heart disease.    Why see the same PCP (doctor/nurse practitioner)?    You can be seen faster when you are sick           You, the PCP (doctor/nurse practitioner) and the office staff get to know each other; you begin to trust them to care for you. You take part in your health choices.   All of you together are a team.    Your medicine is looked at every time you visit, to be sure you are taking the medicine, as the PCP (doctor/nurse practitioner) ordered.    Your PCP (doctor/nurse practitioner) and their staff help keep you healthy and out of the hospital.  They can catch sicknesses earlier by ordering tests once a year to stop or prevent the sickness from getting worse.      Your PCP (doctor/nurse practitioner) can send you to providers who specialize (heart/bone/lung) if you need.  They and their office staff help keep track of your seeing other providers (doctors/nurse practitioners) and tests (CT/ MRIs/ X Rays)) taken.        PCPs want you to stay healthy.  Let us care for you.                     What Do I Do If I Wake Up Sick                                                     If you wake up sick, or you start to fill sick during the day, try these tips to get care and start to feel better soon.                                                                                                                              As soon as you start to feel bad, call your doctor's office and ask for same day or next day visit appointment.        If you cannot be seen with your doctor's  office within 24 hours, you can go to an urgent care and be seen.          How to stay well:     Take your medicine as ordered by your doctor      Fill your Medicine before you run out      Exercise       Enjoy walking in the sunlight daily  Keep your scheduled clinic appointments      Keep you scheduled yearly wellness visits              Budget-Friendly Healthy Eating    Save money at the grocery store and eat healthy.   Buy groceries keeping your budget in mind  Make a grocery list and only buy what you have on the list  Eat food you cook or have at home; limit fast food or eating out    Compare food labels  Look at store brand food labels and compare to brand names, often food value is the same and the store brand is cheaper      Look for products that do not have sugar, fat, or salt (sodium) added.  These often cost the same but are healthier for you.  They may be labeled as:  ?Sugar-free.  ?Nonfat.              ?Low-fat.  ?Sodium-free.  ?Low sodium.  Look for lean ground beef labeled as at least 92% lean and 8% fat.        Shopping    Buy only the items on your grocery list and go only to the areas of the store that have the items on your list.  Use coupons only for foods and brands you normally buy. Avoid buying items you wouldn't normally buy simply because they are on sale.  Check online and in newspapers for weekly deals.  Buy healthy items from the bulk bins when available, such as herbs, spices, flour, pasta, nuts, and dried fruit.  Buy fruits and vegetables that are in season. Prices are usually lower on in-season produce.  Look at the unit price on the price tag. Use it to compare different brands and sizes to find out which item is the best deal.  Choose healthy items that are often low-cost, such as carrots, potatoes, apples, bananas, and oranges. Dried or canned beans are a low-cost protein source.      Buy in bulk and freeze extra food. Items you can buy in bulk include meats, fish, poultry, frozen  "fruits, and frozen vegetables.  Avoid buying "ready-to-eat" foods, such as pre-cut fruits and vegetables and pre-made salads.  If possible, shop around to discover where you can find the best prices. Consider other retailers such as dollar stores, larger wholesale stores, local fruit and vegetable stands, and farmers markets.  Do not shop when you are hungry. If you shop while hungry, it may be hard to stick to your list and budget.      Resist impulse buying. Use your grocery list as your official plan for the week.      Buy a variety of vegetables and fruits by purchasing fresh, frozen, and canned items.  Look at the top and bottom shelves for deals. Foods at eye level (eye level of an adult or child) are usually more expensive.  Be efficient with your time when shopping. The more time you spend at the store, the more money you are likely to spend.  To save money when choosing more expensive foods like meats and dairy:  ?Choose cheaper cuts of meat, such as bone-in chicken thighs and drumsticks instead of skinless and boneless chicken. When you are ready to prepare the chicken, you can remove the skin yourself to make it healthier.  ?Choose lean meats like chicken or turkey instead of beef.  ?Choose canned seafood, such as tuna, salmon, or sardines.  ?Buy eggs as a low-cost source of protein.  ?Buy dried beans and peas, such as lentils, split peas, or kidney beans instead of meats. Dried beans and peas are a good alternative source of protein.  ?Buy the larger tubs of yogurt instead of individual-sized containers.                        Choose water instead of sodas and other sweetened beverages.  Avoid buying chips, cookies, and other "junk food." These items are usually expensive and not healthy.  Meal planning  Do not eat out or get fast food. Prepare food at home.  Make a grocery list and make sure to bring it with you to the store.   Plan meals and snacks according to a grocery list and budget you " "create.  Use leftovers in your meal plan for the week.  Look for recipes where you can cook once and make enough food for two meals.  Include budget-friendly meals like stews, casseroles, and stir-more dishes.  Try some meatless meals or try "no cook" meals like salads.  Make sure that half your plate is filled with fruits or vegetables. Choose from fresh, frozen, or canned fruits and vegetables. If eating canned, remember to rinse them before eating. This will remove any excess salt added for packaging.            Summary  Eating healthy on a budget is possible if you plan your meals according to your budget, buy according to your budget and grocery list, and prepare food yourself.   Tips for buying more food on a limited budget include buying generic brands, using coupons only for foods you normally buy, and buying healthy items from the bulk bins when available.  Tips for buying cheaper food to replace expensive food include choosing cheaper, lean cuts of meat, and buying dried beans and peas.    Discuss any question you have with your doctor.                     Why is taking care of your mouth/teeth/gums important?     Your mouth is the opening to your body.  If not kept clean, it can let in sickness to the rest of your body.     Oral Health Care (Dentists)                 City:  Provider Address Phone Number Insurance Plan   [place]:  None available                    Baljinder Muller, [place] [address] [facility] Baljinder BALL [phone]   ADULTS ONLY Medicaid:  HB & AETNA ONLY             [place]         [facility] ([facility]) [address] [phone]  DENTURES ONLY ADULT Medicaid:  HB & AETNA ONLY             [facility]  [facility] [phone] All Medicaid/Medicare             Ray Murdock, [place] [address]  [facility] Ray Randle [phone] Medicaid Children only 2 to 21           "   [facility] [address], [place] [phone] Medicare             [facility] [address], [place] [phone] Medicaid Children only 2 to 21             [facility] [address], [place] [phone] Medicaid Children only 2 to 21             [place] [facility]  [address], [place] [phone] Medicaid Children only 5 yrs and younger:  lip/tongue tie             [facility] [address] [facility] [phone] Medicaid Children only 2 to 21             [facility] [address] [facility] [phone] All Medicaid/Medicare :  ADULTS             [address] [phone] All Medicaid/Medicare :  ADULTS             [facility] [address] [facility] [phone] Medicaid Children only 2 to 21             [facility]  [address] [place] [phone] Medicaid Children only 2 to 21             Dr. Maria Teresa Zavala,  [facility] [phone] Medicare for Dentures Only             [facility], [address] [phone] All Medicaid/Medicare :   EXCEPT [facility]; ADULTS             [address] [phone]  UHC, IHC, HB, Aetna/Medicare :  ADULTS     [facility]; [place]: [phone]  [facility]; [place]: [phone]

## 2023-03-21 ENCOUNTER — PATIENT OUTREACH (OUTPATIENT)
Dept: EMERGENCY MEDICINE | Facility: HOSPITAL | Age: 23
End: 2023-03-21
Payer: MEDICAID

## 2023-03-30 ENCOUNTER — HOSPITAL ENCOUNTER (EMERGENCY)
Facility: HOSPITAL | Age: 23
Discharge: HOME | End: 2023-03-30
Attending: FAMILY MEDICINE
Payer: MEDICAID

## 2023-03-30 VITALS
OXYGEN SATURATION: 100 % | TEMPERATURE: 99 F | HEART RATE: 87 BPM | DIASTOLIC BLOOD PRESSURE: 88 MMHG | RESPIRATION RATE: 16 BRPM | BODY MASS INDEX: 24.97 KG/M2 | HEIGHT: 60 IN | SYSTOLIC BLOOD PRESSURE: 112 MMHG | WEIGHT: 127.19 LBS

## 2023-03-30 DIAGNOSIS — R10.9 ABDOMINAL CRAMPING AFFECTING PREGNANCY: Primary | ICD-10-CM

## 2023-03-30 DIAGNOSIS — Z3A.14 14 WEEKS GESTATION OF PREGNANCY: ICD-10-CM

## 2023-03-30 DIAGNOSIS — O26.899 ABDOMINAL CRAMPING AFFECTING PREGNANCY: Primary | ICD-10-CM

## 2023-03-30 LAB
ALBUMIN SERPL-MCNC: 3.5 G/DL (ref 3.5–5)
ALBUMIN/GLOB SERPL: 0.9 RATIO (ref 1.1–2)
ALP SERPL-CCNC: 51 UNIT/L (ref 40–150)
ALT SERPL-CCNC: 8 UNIT/L (ref 0–55)
AMORPH URATE CRY URNS QL MICRO: ABNORMAL /UL
APPEARANCE UR: ABNORMAL
AST SERPL-CCNC: 13 UNIT/L (ref 5–34)
BACTERIA #/AREA URNS AUTO: ABNORMAL /HPF
BASOPHILS # BLD AUTO: 0.01 X10(3)/MCL (ref 0–0.2)
BASOPHILS NFR BLD AUTO: 0.1 %
BILIRUB UR QL STRIP.AUTO: NEGATIVE MG/DL
BILIRUBIN DIRECT+TOT PNL SERPL-MCNC: 0.2 MG/DL
BUN SERPL-MCNC: 8.7 MG/DL (ref 7–18.7)
CALCIUM SERPL-MCNC: 9.7 MG/DL (ref 8.4–10.2)
CHLORIDE SERPL-SCNC: 105 MMOL/L (ref 98–107)
CO2 SERPL-SCNC: 23 MMOL/L (ref 22–29)
COLOR UR AUTO: YELLOW
CREAT SERPL-MCNC: 0.65 MG/DL (ref 0.55–1.02)
EOSINOPHIL # BLD AUTO: 0.04 X10(3)/MCL (ref 0–0.9)
EOSINOPHIL NFR BLD AUTO: 0.5 %
ERYTHROCYTE [DISTWIDTH] IN BLOOD BY AUTOMATED COUNT: 12.5 % (ref 11.5–17)
GFR SERPLBLD CREATININE-BSD FMLA CKD-EPI: >60 MLS/MIN/1.73/M2
GLOBULIN SER-MCNC: 3.9 GM/DL (ref 2.4–3.5)
GLUCOSE SERPL-MCNC: 88 MG/DL (ref 74–100)
GLUCOSE UR QL STRIP.AUTO: NORMAL MG/DL
HCT VFR BLD AUTO: 35.2 % (ref 37–47)
HGB BLD-MCNC: 12.3 G/DL (ref 12–16)
IMM GRANULOCYTES # BLD AUTO: 0.03 X10(3)/MCL (ref 0–0.04)
IMM GRANULOCYTES NFR BLD AUTO: 0.4 %
KETONES UR QL STRIP.AUTO: NEGATIVE MG/DL
LEUKOCYTE ESTERASE UR QL STRIP.AUTO: NEGATIVE UNIT/L
LYMPHOCYTES # BLD AUTO: 1.35 X10(3)/MCL (ref 0.6–4.6)
LYMPHOCYTES NFR BLD AUTO: 18.3 %
MCH RBC QN AUTO: 30.1 PG (ref 27–31)
MCHC RBC AUTO-ENTMCNC: 34.9 G/DL (ref 33–36)
MCV RBC AUTO: 86.3 FL (ref 80–94)
MONOCYTES # BLD AUTO: 0.39 X10(3)/MCL (ref 0.1–1.3)
MONOCYTES NFR BLD AUTO: 5.3 %
NEUTROPHILS # BLD AUTO: 5.54 X10(3)/MCL (ref 2.1–9.2)
NEUTROPHILS NFR BLD AUTO: 75.4 %
NITRITE UR QL STRIP.AUTO: NEGATIVE
NRBC BLD AUTO-RTO: 0 %
PH UR STRIP.AUTO: 8 [PH]
PLATELET # BLD AUTO: 283 X10(3)/MCL (ref 130–400)
PMV BLD AUTO: 8.8 FL (ref 7.4–10.4)
POTASSIUM SERPL-SCNC: 3.7 MMOL/L (ref 3.5–5.1)
PROT SERPL-MCNC: 7.4 GM/DL (ref 6.4–8.3)
PROT UR QL STRIP.AUTO: ABNORMAL MG/DL
RBC # BLD AUTO: 4.08 X10(6)/MCL (ref 4.2–5.4)
RBC #/AREA URNS AUTO: ABNORMAL /HPF
RBC UR QL AUTO: NEGATIVE UNIT/L
SODIUM SERPL-SCNC: 134 MMOL/L (ref 136–145)
SP GR UR STRIP.AUTO: 1.02
SQUAMOUS #/AREA URNS LPF: ABNORMAL /HPF
UROBILINOGEN UR STRIP-ACNC: NORMAL MG/DL
WBC # SPEC AUTO: 7.4 X10(3)/MCL (ref 4.5–11.5)
WBC #/AREA URNS AUTO: ABNORMAL /HPF

## 2023-03-30 PROCEDURE — 80053 COMPREHEN METABOLIC PANEL: CPT | Performed by: NURSE PRACTITIONER

## 2023-03-30 PROCEDURE — 99284 EMERGENCY DEPT VISIT MOD MDM: CPT | Mod: 25

## 2023-03-30 PROCEDURE — 85025 COMPLETE CBC W/AUTO DIFF WBC: CPT | Performed by: NURSE PRACTITIONER

## 2023-03-30 PROCEDURE — 81001 URINALYSIS AUTO W/SCOPE: CPT | Performed by: NURSE PRACTITIONER

## 2023-03-30 RX ORDER — OMEPRAZOLE 20 MG/1
20 CAPSULE, DELAYED RELEASE ORAL DAILY
Qty: 30 CAPSULE | Refills: 2 | Status: SHIPPED | OUTPATIENT
Start: 2023-03-30 | End: 2023-03-30 | Stop reason: CLARIF

## 2023-03-30 RX ORDER — METOCLOPRAMIDE 10 MG/1
10 TABLET ORAL EVERY 6 HOURS PRN
Qty: 16 TABLET | Refills: 0 | Status: SHIPPED | OUTPATIENT
Start: 2023-03-30 | End: 2023-04-03

## 2023-03-30 NOTE — ED PROVIDER NOTES
Encounter Date: 3/30/2023       History     Chief Complaint   Patient presents with    Abdominal Pain     Abd pain approx 12wks pregnant, denies vaginal discharge or previous hx of miscarriage. C/O Dysuria     Pt is a 22 y.o. female who presents to the [facility] ED complaining of mild pelvic cramping and nausea. Denies vaginal discharge, vaginal bleeding, chest pain, SOB, weakness, dizziness, or fever. Pt is approx 12 week pregnant. . Currently awaiting her first OB appointment.     Review of patient's allergies indicates:  No Known Allergies  Past Medical History:   Diagnosis Date    Asthma      No past surgical history on file.  No family history on file.  Social History     Tobacco Use    Smoking status: Every Day     Types: Cigarettes, Vaping with nicotine    Smokeless tobacco: Never   Substance Use Topics    Alcohol use: Never    Drug use: Never     Review of Systems   Constitutional:  Negative for chills, diaphoresis, fatigue and fever.   HENT:  Negative for facial swelling, rhinorrhea, sinus pressure, sinus pain, sore throat and trouble swallowing.    Respiratory:  Negative for cough, chest tightness, shortness of breath and wheezing.    Cardiovascular:  Negative for chest pain, palpitations and leg swelling.   Gastrointestinal:  Positive for nausea. Negative for abdominal pain, diarrhea and vomiting.   Genitourinary:  Positive for pelvic pain. Negative for dysuria, flank pain, frequency, hematuria, urgency, vaginal bleeding and vaginal discharge.   Musculoskeletal:  Negative for arthralgias, back pain, joint swelling and myalgias.   Skin:  Negative for color change and rash.   Neurological:  Negative for dizziness, syncope, weakness and light-headedness.   Hematological:  Does not bruise/bleed easily.   All other systems reviewed and are negative.    Physical Exam     Initial Vitals   BP Pulse Resp Temp SpO2   23 1039 23 1036 23 1036 23 1036 23 1036   114/75 90 19 98.6 °F (37 °C)  99 %      MAP       --                Physical Exam    Nursing note and vitals reviewed.  Constitutional: She appears well-developed and well-nourished.   HENT:   Head: Normocephalic and atraumatic.   Nose: Nose normal.   Mouth/Throat: Oropharynx is clear and moist.   Eyes: Conjunctivae and EOM are normal. Pupils are equal, round, and reactive to light.   Neck: Neck supple.   Normal range of motion.  Cardiovascular:  Normal rate, regular rhythm, normal heart sounds and intact distal pulses.           Pulmonary/Chest: Effort normal and breath sounds normal. No respiratory distress. She has no wheezes. She has no rhonchi. She has no rales. She exhibits no tenderness.   Abdominal: Abdomen is soft and flat. Bowel sounds are normal. She exhibits no distension. There is abdominal tenderness in the suprapubic area. There is no rebound, no guarding, no tenderness at McBurney's point and negative Acosta's sign. negative psoas sign  Musculoskeletal:         General: Normal range of motion.      Cervical back: Normal range of motion and neck supple.     Neurological: She is alert and oriented to person, place, and time. She has normal strength and normal reflexes.   Skin: Skin is warm and dry. Capillary refill takes less than 2 seconds.   Psychiatric: She has a normal mood and affect. Her speech is normal and behavior is normal. Judgment and thought content normal.       ED Course   Procedures  Labs Reviewed   COMPREHENSIVE METABOLIC PANEL - Abnormal; Notable for the following components:       Result Value    Sodium Level 134 (*)     Globulin 3.9 (*)     Albumin/Globulin Ratio 0.9 (*)     All other components within normal limits   URINALYSIS, REFLEX TO URINE CULTURE - Abnormal; Notable for the following components:    Appearance, UA Turbid (*)     Protein, UA Trace (*)     All other components within normal limits   CBC WITH DIFFERENTIAL - Abnormal; Notable for the following components:    RBC 4.08 (*)     Hct 35.2 (*)      All other components within normal limits   CBC W/ AUTO DIFFERENTIAL    Narrative:     The following orders were created for panel order CBC auto differential.  Procedure                               Abnormality         Status                     ---------                               -----------         ------                     CBC with Differential[[id number]]        Abnormal            Final result                 Please view results for these tests on the individual orders.   EXTRA TUBES    Narrative:     The following orders were created for panel order EXTRA TUBES.  Procedure                               Abnormality         Status                     ---------                               -----------         ------                     Gold Top Hold[[id number]]                                    In process                 Pink Top Hold[[id number]]                                    In process                   Please view results for these tests on the individual orders.   GOLD TOP HOLD   PINK TOP HOLD          Imaging Results              US OB 14+ Wks, TransAbd, Single Gestation (Final result)  Result time 03/30/23 12:28:38   Procedure changed from US OB <14 Wks TransAbd & TransVag, Single Gestation (XPD)     Final result by Cooper Landon MD (03/30/23 12:28:38)                   Impression:      Single intrauterine gestation with average ultrasound age of 14 weeks 5 days as described.  Detailed fetal anatomic survey is recommended as an outpatient under OB supervision at 18-20 weeks gestation.      Electronically signed by: Cooper Landon  Date:    03/30/2023  Time:    12:28               Narrative:    EXAMINATION:  US OB 14+ WEEKS, TRANSABDOM, SINGLE GESTATION    CLINICAL HISTORY:  Abdominal pain    COMPARISON:  No priors    FINDINGS:  There is a single intrauterine gestation.  Fetal heart rate was 162 BPM.  Anterior placenta.  Detailed fetal anatomic survey was not performed.  Cervix is closed measuring  about 4 cm in length.    Average ultrasound age is 14 weeks 5 days.    BPD = 2.9 cm-15 weeks 1 days.    HC = 10.1 cm-14 weeks 5 days.    AC = 8.4 cm-14 weeks 5 days.    FL = 1.5 cm-14 weeks 3 days.    Estimated fetal weight-102 grams +/-15 grams (0 pounds 4 ounces +/-1 ounces)                                       Medications - No data to display  Medical Decision Making:   Differential Diagnosis:   UTI  Ectopic  Abdominal pain  Clinical Tests:   Lab Tests: Ordered and Reviewed  Radiological Study: Ordered and Reviewed  ED Management:  12:26 PM Reassessed patient at this time. Reports condition has improved. Discussed with patient all pertinent ED information and results. Discussed diagnosis and treatment plan with patient. Pt will keep scheduled appointment with her OB physician as planned. I will provide pt medication for the nausea symptoms. Strict return precautions discussed including if vaginal bleeding, acute pelvic pain, or fever occurs. Follow up instructions and return to ED instruction have been given. All questions and concerns were addressed at this time. Patient voices understanding of information and instructions. Patient is comfortable with plan and discharge. Patient is stable for discharge.                           Clinical Impression:   Final diagnoses:  [O26.899, R10.9] Abdominal cramping affecting pregnancy (Primary)  [Z3A.14] 14 weeks gestation of pregnancy        ED Disposition Condition    Discharge Stable          ED Prescriptions       Medication Sig Dispense Start Date End Date Auth. Provider    omeprazole (PRILOSEC) 20 MG capsule  (Status: Discontinued) Take 1 capsule (20 mg total) by mouth once daily. 30 capsule 3/30/2023 3/30/2023 Cooper Padilla Jr., DENIZP    metoclopramide HCl (REGLAN) 10 MG tablet Take 1 tablet (10 mg total) by mouth every 6 (six) hours as needed (nausea). 16 tablet 3/30/2023 4/3/2023 Cooper Padilla Jr., ILIANA          Follow-up Information       Follow up With  Specialties Details Why Contact Info    [facility] Emergency Medicine In 3 days As needed, If symptoms worsen [address] [facility] [address] [phone]             Cooper Padilla Jr., FNP  03/30/23 1239

## 2023-03-30 NOTE — DISCHARGE INSTRUCTIONS
Follow up with your primary care physician in 3-5 days for follow up evaluation.  Follow up with your OB physician as planned.  Present to nearest ED immediately for pelvic pain, vaginal bleeding, fever, or vaginal discharge.  Take medication as prescribed.

## 2023-03-31 ENCOUNTER — PATIENT OUTREACH (OUTPATIENT)
Dept: EMERGENCY MEDICINE | Facility: HOSPITAL | Age: 23
End: 2023-03-31
Payer: MEDICAID

## 2023-04-13 ENCOUNTER — OFFICE VISIT (OUTPATIENT)
Dept: FAMILY MEDICINE | Facility: CLINIC | Age: 23
End: 2023-04-13
Payer: MEDICAID

## 2023-04-13 VITALS
HEIGHT: 60 IN | OXYGEN SATURATION: 100 % | SYSTOLIC BLOOD PRESSURE: 103 MMHG | TEMPERATURE: 99 F | RESPIRATION RATE: 20 BRPM | WEIGHT: 127 LBS | DIASTOLIC BLOOD PRESSURE: 71 MMHG | HEART RATE: 76 BPM | BODY MASS INDEX: 24.94 KG/M2

## 2023-04-13 DIAGNOSIS — O99.320 MARIJUANA USE DURING PREGNANCY: ICD-10-CM

## 2023-04-13 DIAGNOSIS — Z33.1 PREGNANCY AS INCIDENTAL FINDING: ICD-10-CM

## 2023-04-13 DIAGNOSIS — F12.90 MARIJUANA USE DURING PREGNANCY: ICD-10-CM

## 2023-04-13 DIAGNOSIS — K21.9 GASTROESOPHAGEAL REFLUX DISEASE, UNSPECIFIED WHETHER ESOPHAGITIS PRESENT: ICD-10-CM

## 2023-04-13 DIAGNOSIS — Z34.90 PREGNANCY, UNSPECIFIED GESTATIONAL AGE: Primary | ICD-10-CM

## 2023-04-13 LAB
ALBUMIN SERPL-MCNC: 3.3 G/DL (ref 3.5–5)
ALBUMIN/GLOB SERPL: 0.8 RATIO (ref 1.1–2)
ALP SERPL-CCNC: 54 UNIT/L (ref 40–150)
ALT SERPL-CCNC: 8 UNIT/L (ref 0–55)
AMORPH URATE CRY URNS QL MICRO: ABNORMAL /UL
APPEARANCE UR: ABNORMAL
AST SERPL-CCNC: 11 UNIT/L (ref 5–34)
BACTERIA #/AREA URNS AUTO: ABNORMAL /HPF
BILIRUB SERPL-MCNC: NORMAL MG/DL
BILIRUB UR QL STRIP.AUTO: NEGATIVE MG/DL
BILIRUBIN DIRECT+TOT PNL SERPL-MCNC: 0.1 MG/DL
BLOOD URINE, POC: NORMAL
BUN SERPL-MCNC: 9.1 MG/DL (ref 7–18.7)
CALCIUM SERPL-MCNC: 9.5 MG/DL (ref 8.4–10.2)
CHLORIDE SERPL-SCNC: 103 MMOL/L (ref 98–107)
CLARITY, POC UA: NORMAL
CO2 SERPL-SCNC: 24 MMOL/L (ref 22–29)
COLOR UR AUTO: YELLOW
COLOR, POC UA: YELLOW
CREAT SERPL-MCNC: 0.66 MG/DL (ref 0.55–1.02)
GFR SERPLBLD CREATININE-BSD FMLA CKD-EPI: >60 MLS/MIN/1.73/M2
GLOBULIN SER-MCNC: 4 GM/DL (ref 2.4–3.5)
GLUCOSE SERPL-MCNC: 77 MG/DL (ref 74–100)
GLUCOSE UR QL STRIP.AUTO: NORMAL MG/DL
GLUCOSE UR QL STRIP: NORMAL
GROUP & RH: NORMAL
HBV SURFACE AG SERPL QL IA: NONREACTIVE
HCV AB SERPL QL IA: NONREACTIVE
HIV 1+2 AB+HIV1 P24 AG SERPL QL IA: NONREACTIVE
INDIRECT COOMBS GEL: NORMAL
KETONES UR QL STRIP.AUTO: NEGATIVE MG/DL
KETONES UR QL STRIP: NORMAL
LEUKOCYTE ESTERASE UR QL STRIP.AUTO: NEGATIVE UNIT/L
LEUKOCYTE ESTERASE URINE, POC: NORMAL
MUCOUS THREADS URNS QL MICRO: ABNORMAL /LPF
NITRITE UR QL STRIP.AUTO: NEGATIVE
NITRITE, POC UA: NORMAL
PH UR STRIP.AUTO: 7 [PH]
PH, POC UA: 7
POTASSIUM SERPL-SCNC: 3.6 MMOL/L (ref 3.5–5.1)
PROT SERPL-MCNC: 7.3 GM/DL (ref 6.4–8.3)
PROT UR QL STRIP.AUTO: NEGATIVE MG/DL
PROTEIN, POC: NORMAL
RBC #/AREA URNS AUTO: ABNORMAL /HPF
RBC UR QL AUTO: NEGATIVE UNIT/L
SODIUM SERPL-SCNC: 135 MMOL/L (ref 136–145)
SP GR UR STRIP.AUTO: 1.02
SPECIFIC GRAVITY, POC UA: 1.02
SPECIMEN OUTDATE: NORMAL
SQUAMOUS #/AREA URNS LPF: ABNORMAL /HPF
T PALLIDUM AB SER QL: NONREACTIVE
UROBILINOGEN UR STRIP-ACNC: NORMAL MG/DL
UROBILINOGEN, POC UA: 0.2
WBC #/AREA URNS AUTO: ABNORMAL /HPF

## 2023-04-13 PROCEDURE — 86762 RUBELLA ANTIBODY: CPT | Mod: 90

## 2023-04-13 PROCEDURE — 81511 FTL CGEN ABNOR FOUR ANAL: CPT | Mod: 90

## 2023-04-13 PROCEDURE — 87340 HEPATITIS B SURFACE AG IA: CPT

## 2023-04-13 PROCEDURE — 86780 TREPONEMA PALLIDUM: CPT

## 2023-04-13 PROCEDURE — 85660 RBC SICKLE CELL TEST: CPT

## 2023-04-13 PROCEDURE — 81001 URINALYSIS AUTO W/SCOPE: CPT

## 2023-04-13 PROCEDURE — 86803 HEPATITIS C AB TEST: CPT

## 2023-04-13 PROCEDURE — 36415 COLL VENOUS BLD VENIPUNCTURE: CPT

## 2023-04-13 PROCEDURE — 87389 HIV-1 AG W/HIV-1&-2 AB AG IA: CPT

## 2023-04-13 PROCEDURE — 86900 BLOOD TYPING SEROLOGIC ABO: CPT | Performed by: FAMILY MEDICINE

## 2023-04-13 PROCEDURE — 86787 VARICELLA-ZOSTER ANTIBODY: CPT | Mod: 90

## 2023-04-13 PROCEDURE — 87088 URINE BACTERIA CULTURE: CPT

## 2023-04-13 PROCEDURE — 99214 OFFICE O/P EST MOD 30 MIN: CPT | Mod: PBBFAC

## 2023-04-13 PROCEDURE — 81002 URINALYSIS NONAUTO W/O SCOPE: CPT | Mod: PBBFAC

## 2023-04-13 PROCEDURE — 80053 COMPREHEN METABOLIC PANEL: CPT

## 2023-04-13 RX ORDER — CALC/MAG/B COMPLEX/D3/HERB 61
15 TABLET ORAL DAILY
Qty: 30 CAPSULE | Refills: 3 | Status: SHIPPED | OUTPATIENT
Start: 2023-04-13 | End: 2023-09-18

## 2023-04-13 NOTE — PATIENT INSTRUCTIONS
Well Child Exam    About this topic  A well child exam is a visit with your child's doctor to check your child's health. The doctor will check your child's growth, progress, and shot record. It is also a time for you to ask your child's doctor any questions you have about your child's health. Your child will have a full exam during the office visit. Other things that are sometimes checked are hearing, eyesight, and urine or blood tests. The doctor may give shots during your child's well visit.    General    Getting Ready for a Well Child Exam    A well child exam is a good time for you to talk with your child's doctor about any of these topics:    Eating habits or diet    How your child acts    Sleep issues    Growth    Safety    Vaccines    Toilet training    Teen years    How your child is doing in school or any learning concerns    Home life    You may want to make a written list of the things you want to talk about with your child's doctor. Be sure to bring your list of questions to your child's well visit. You may also want to do some research on your own before your office visit by reading books or looking at Web sites. Other family members, child caregivers, and grandparents may be able to help you too. Your child's doctor may ask also you about your family's health history or if your child is around anyone who smokes.    The Exam    The doctor measures your child's weight, height, and sometimes head size or body mass index (BMI). The doctor plots these numbers on a growth curve. The growth curve gives a picture of your baby's growth at each visit. The doctor may check your child's temperature, blood pressure, breathing, and heart rate. The doctor may listen to your child's heart, lungs, and belly. Your doctor will do a full exam of your child from the head to the toes.    Growth and Development Questions    Your doctor will ask you about your child's progress. The doctor will focus on the skills that are  likely to happen at your child's age. Some of these are motor skills like rolling over, walking, and running, while others are social skills, or how your child interacts with other people. Your child's doctor will also ask you how your child is doing in school.    Help for Parents    Your doctor will talk with you about any concerns you have about your child during this visit. The doctor may also talk with you about:    Getting family help or other support    Ways to help your child's brain growth    How your child plays and acts with others    Ways to help your child exercise    Safety    Eating habits    Vaccines    Quitting smoking    Help if you have a low mood after having a baby    Shots or Vaccines    It is important for your child to get shots on time. This protects from very serious illnesses like pertussis, measles, or some kinds of pneumonia. Sometimes, your child may need more than one dose of vaccine. The vaccines used today are safer than ever. Talk to your doctor if you have any questions or concerns about giving your child vaccines.    Well Child Exam Schedule    The American Academy of Pediatrics (AAP) suggests this plan for well child visits:    [place] (3 to 5 days old)    1 month old    2 months old    4 months old    6 months old    9 months old    12 months old    15 months old    18 months old    2 years old    30 months old    3 years old    4 years old    Once each year until age 21    Well child exams are very important. Since your child is healthy at this visit and it is scheduled ahead of time, you can think about things you want to ask your child's doctor. Be sure to follow the above plan for well child visits as well as any other visits your child's doctor suggests.    Where can I learn more?    Centers for Disease Control and Prevention    http://www.cdc.gov/vaccines     Healthy  Children    https://www.healthychildren.org/English/family-life/health-management/Pages/Well-Child-Care-A-Check-Up-for-Success.aspx    Disclaimer.  This generalized information is a limited summary of diagnosis, treatment, and/or medication information. It is not meant to be comprehensive and should be used as a tool to help the user understand and/or assess potential diagnostic and treatment options. It does NOT include all information about conditions, treatments, medications, side effects, or risks that may apply to a specific patient. It is not intended to be medical advice or a substitute for the medical advice, diagnosis, or treatment of a health care provider based on the health care provider's examination and assessment of a patients specific and unique circumstances. Patients must speak with a health care provider for complete information about their health, medical questions, and treatment options, including any risks or benefits regarding use of medications. This information does not endorse any treatments or medications as safe, effective, or approved for treating a specific patient. [employer] and its affiliates disclaim any warranty or liability relating to this information or the use thereof. The use of this information is governed by the Terms of Use, available at Terms of Use. ©2022 [employer], Inc. and its affiliates and/or licensors. All rights reserved.

## 2023-04-13 NOTE — PROGRESS NOTES
OB Office Visit Note    Name: Ebonie Fall  MRN: [id number]  Date: 2023    Subjective:      Chief Complaint: Initial Prenatal Visit (16wks 5d ) and Nausea       22 y.o.  at 16w5d with ANKITA 2023, by second semester ultrasound presents to initial OB clinic.    Current issues: Nausea    Chronic issues: None    Antepartum specific ROS  - Fetal movements: Yes   - Vaginal bleeding: No  - Vaginal discharge: No  - Loss of fluid: No  - Contractions: No  - Headaches: Yes occasional; relieved with tylenol  - Vision changes: No  - Edema: No      PMHx: no pertinent PMH reported   PSHx: none  SH: support at home, no tobacco use, employed by [employer], uses illicit drugs, Previously marijuana use this pregnancy, and Lives at home with partner, daughter  FHx: Dad Sickle Cell  Meds: PNV    Allergies: Review of patient's allergies indicates:  No Known Allergies    Gestational History:   G1 2019, 38 weeks, vaginal delivery, 7 pounds 19 ounces, no complications, [facility]  G2 Current pregnancy    GYNHx:   LMP: Patient's last menstrual period was 2022 (approximate).   Menarche at 9   Menstrual Hx: regular, 5 pads/day, 5-6 days per period  Hx of birth control: Depo-Provera injections   Hx of STDs: none   History of Abnormal PAP: No      Review of Systems  Constitutional: no fever, no chills  CV: no chest pain  RESP: no SOB  : no dysuria, no hematuria  GI: no constipation, no diarrhea, no nausea, no vomiting  Psych: no depression, no anxiety; No SI/HI    Objective:      Vitals:    23 0921   BP: 103/71   BP Location: Right arm   Patient Position: Sitting   Pulse: 76   Resp: 20   Temp: 98.5 °F (36.9 °C)   TempSrc: Oral   SpO2: 100%   Weight: 57.6 kg (127 lb)   Height: 5' (1.524 m)     General:   RESP: clear to auscultation bilaterally, non labored  CV: regular rate and rhythm, no murmurs, no edema  ABD: gravid, nontender, BS+ FHT present  FHTs: 153 bpm  Fundal height: below  level of umbilicus  Cervix: Declined exam    Initial OB Labs: Ordered 4/13/2023  - Blood Type and Rh:   - Antibody Screen:   - CBC H/H:   - HIV:   - RPR:   - GC:   - CT:   - HBsAg:   - HCVAb:   - Rubella:   - Varicella:   - UA & Culture:   - Sickle Cell Screen:   - PAP:   - Influenza vaccine date:     15-20 Weeks: Lab Ordered 4/13/2023  - Quad Screen:     - 20 wk anatomy US:    28 Week Lab: Ordered   - 1H GTT:   - Rhogam:   - Date of Tdap:   - CBC H/H:   - RPR:   - BTL consent:     36 Week Lab: Ordered   - CBC H/H:   - RPR:   - GBS Culture:   - HIV:   - Cervical GC:     Urine dip:  Lab Results   Component Value Date    COLORU Yellow 04/13/2023    SPECGRAV 1.025 04/13/2023    PHUR 7.0 04/13/2023    WBCUR neg 04/13/2023    NITRITE neg 04/13/2023    PROTEINPOC neg 04/13/2023    GLUCOSEUR neg 04/13/2023    KETONESU neg 04/13/2023    UROBILINOGEN 0.2 04/13/2023    BILIRUBINPOC neg 04/13/2023    RBCUR trace 04/13/2023     Assessment/Plan:     Ebonie was seen today for initial prenatal visit and nausea.    Diagnoses and all orders for this visit:    Pregnancy, unspecified gestational age  -     Quad Screen Maternal, Serum; Future  -     Type & Screen; Future  -     HIV 1/2 Ag/Ab (4th Gen); Future  -     Hepatitis C Antibody; Future  -     Hepatitis B Surface Antigen; Future  -     Chlamydia/GC, PCR  -     Rubella antibody, IgG; Future  -     Varicella zoster antibody, IgG; Future  -     Urinalysis  -     Urine Culture High Risk  -     Sickle Cell Screen; Future  -     Liquid-Based Pap Smear, Screening Screening  -     POCT urine dipstick without microscope  -     Sickle Cell Screen  -     Varicella zoster antibody, IgG  -     Rubella antibody, IgG  -     Hepatitis B Surface Antigen  -     Hepatitis C Antibody  -     HIV 1/2 Ag/Ab (4th Gen)  -     Type & Screen  -     Quad Screen Maternal, Serum  -     lansoprazole (PREVACID) 15 MG capsule; Take 1 capsule (15 mg total) by mouth once daily.  -     SYPHILIS ANTIBODY (WITH  REFLEX RPR); Future  -     Comprehensive Metabolic Panel; Future  -     Comprehensive Metabolic Panel  -     SYPHILIS ANTIBODY (WITH REFLEX RPR)    Pregnancy as incidental finding  -     Ambulatory referral/consult to Family Practice    Gastroesophageal reflux disease, unspecified whether esophagitis present  -     lansoprazole (PREVACID) 15 MG capsule; Take 1 capsule (15 mg total) by mouth once daily.    Marijuana use during pregnancy         Pregnancy  -     Declined pap due to time constraint; has job interview at   -     POCT urine dipstick without microscope  -     OB Protocol   -     PNVs  -     Urine dip reviewed as above  -     Routine (initial) labs: as mentioned above/pending  -     Postpartum contraception discussion: PENDING  -     Discussed Tums/Prevacid for Reflux symptoms  -     Patient is an aspirin candidate; did not discuss in detail with patient due to shortened visit    Return to clinic in 4 weeks

## 2023-04-14 LAB
HGB S BLD QL SOLY: NEGATIVE
RUBV IGG SERPL IA-ACNC: 0.6
RUBV IGG SERPL QL IA: NEGATIVE
VZV IGG SER IA-ACNC: 0.5
VZV IGG SER QL IA: NEGATIVE

## 2023-04-15 LAB — BACTERIA UR CULT: NORMAL

## 2023-04-17 DIAGNOSIS — Z3A.17 17 WEEKS GESTATION OF PREGNANCY: Primary | ICD-10-CM

## 2023-04-17 LAB
# FETUSES: NORMAL
2ND TRIMESTER 4 SCREEN SERPL-IMP: NORMAL
AFP ADJ MOM SERPL: 0.84 MOM
AFP SERPL IA-MCNC: 38.6 NG/ML
AGE AT DELIVERY: NORMAL
B-HCG ADJ MOM SERPL: 1.26 MOM
CHORION TYPE: NORMAL
COLLECT DATE: NORMAL
CURRENT SMOKER: NORMAL
FET TS 21 RISK FROM MAT AGE: NORMAL
GA METHOD: NORMAL
GA US.COMPOSITE.EST: NORMAL WK,D
HCG SERPL IA-ACNC: 46.7 IU/ML
HX OF NTD QL: NO
HX OF NTD QL: NO
HX OF TRISOMY 21 QL: NO
IDDM PATIENT QL: NO
INHIBIN A ADJ MOM SERPL: 0.88 MOM
INHIBIN SERPL-MCNC: 124 PG/ML
IVF PREGNANCY: NO
LABORATORY COMMENT REPORT: NORMAL
M PHYSICIAN PHONE NUMBER: NORMAL
MATERNAL RISK FACTORS: NORMAL
NEURAL TUBE DEFECT RISK FETUS: NORMAL %
RECOM F/U: NORMAL
TEST PERFORMANCE INFO SPEC: NORMAL
TS 18 RISK FETUS: NORMAL
TS 21 RISK FETUS: NORMAL
U ESTRIOL ADJ MOM SERPL: 0.63 MOM
U ESTRIOL SERPL-MCNC: 0.7 NG/ML

## 2023-05-10 ENCOUNTER — PROCEDURE VISIT (OUTPATIENT)
Dept: MATERNAL FETAL MEDICINE | Facility: CLINIC | Age: 23
End: 2023-05-10
Payer: MEDICAID

## 2023-05-10 DIAGNOSIS — Z3A.17 17 WEEKS GESTATION OF PREGNANCY: ICD-10-CM

## 2023-05-10 PROCEDURE — 76805 OB US >/= 14 WKS SNGL FETUS: CPT | Mod: S$GLB,,, | Performed by: OBSTETRICS & GYNECOLOGY

## 2023-05-10 PROCEDURE — 76805 US MFM PROCEDURE (VIEWPOINT): ICD-10-PCS | Mod: S$GLB,,, | Performed by: OBSTETRICS & GYNECOLOGY

## 2023-05-15 ENCOUNTER — OFFICE VISIT (OUTPATIENT)
Dept: FAMILY MEDICINE | Facility: CLINIC | Age: 23
End: 2023-05-15
Payer: MEDICAID

## 2023-05-15 VITALS
TEMPERATURE: 98 F | SYSTOLIC BLOOD PRESSURE: 108 MMHG | HEIGHT: 60 IN | RESPIRATION RATE: 18 BRPM | WEIGHT: 134 LBS | OXYGEN SATURATION: 100 % | BODY MASS INDEX: 26.31 KG/M2 | HEART RATE: 94 BPM | DIASTOLIC BLOOD PRESSURE: 74 MMHG

## 2023-05-15 DIAGNOSIS — Z3A.21 21 WEEKS GESTATION OF PREGNANCY: Primary | ICD-10-CM

## 2023-05-15 PROBLEM — F12.10 CANNABIS ABUSE: Status: ACTIVE | Noted: 2023-05-15

## 2023-05-15 PROBLEM — S52.044D: Status: ACTIVE | Noted: 2022-06-08

## 2023-05-15 LAB
C TRACH DNA SPEC QL NAA+PROBE: NOT DETECTED
CLUE CELLS VAG QL WET PREP: ABNORMAL
N GONORRHOEA DNA SPEC QL NAA+PROBE: NOT DETECTED
T VAGINALIS VAG QL WET PREP: ABNORMAL
WBC #/AREA VAG WET PREP: ABNORMAL
YEAST SPEC QL WET PREP: ABNORMAL

## 2023-05-15 PROCEDURE — 87481 CANDIDA DNA AMP PROBE: CPT

## 2023-05-15 PROCEDURE — 87491 CHLMYD TRACH DNA AMP PROBE: CPT

## 2023-05-15 PROCEDURE — 87591 N.GONORRHOEAE DNA AMP PROB: CPT

## 2023-05-15 PROCEDURE — 87661 TRICHOMONAS VAGINALIS AMPLIF: CPT

## 2023-05-15 PROCEDURE — 87511 GARDNER VAG DNA AMP PROBE: CPT

## 2023-05-15 PROCEDURE — 99214 OFFICE O/P EST MOD 30 MIN: CPT | Mod: PBBFAC

## 2023-05-15 PROCEDURE — 87210 SMEAR WET MOUNT SALINE/INK: CPT

## 2023-05-15 PROCEDURE — 88174 CYTOPATH C/V AUTO IN FLUID: CPT

## 2023-05-15 RX ORDER — MICONAZOLE NITRATE 200 MG/1
200 SUPPOSITORY VAGINAL NIGHTLY
Qty: 7 SUPPOSITORY | Refills: 0 | Status: SHIPPED | OUTPATIENT
Start: 2023-05-15 | End: 2023-05-16 | Stop reason: ALTCHOICE

## 2023-05-16 RX ORDER — MICONAZOLE NITRATE 2 %
1 CREAM WITH APPLICATOR VAGINAL NIGHTLY
Qty: 45 G | Refills: 0 | Status: SHIPPED | OUTPATIENT
Start: 2023-05-16 | End: 2023-05-16 | Stop reason: SDUPTHER

## 2023-05-16 RX ORDER — MICONAZOLE NITRATE 2 %
1 CREAM WITH APPLICATOR VAGINAL NIGHTLY
Qty: 45 G | Refills: 0 | Status: SHIPPED | OUTPATIENT
Start: 2023-05-16 | End: 2023-06-14 | Stop reason: ALTCHOICE

## 2023-05-16 NOTE — PROGRESS NOTES
I reviewed History, PE, A/P and medical record.  Services provided in outpatient department of a teaching hospital/facility, I was immediately available.  I agree with resident. Care provided was reasonable and necessary.   I discussed the patient with resident at time of visit.

## 2023-05-22 LAB — PSYCHE PATHOLOGY RESULT: NORMAL

## 2023-05-27 ENCOUNTER — HOSPITAL ENCOUNTER (EMERGENCY)
Facility: HOSPITAL | Age: 23
Discharge: HOME | End: 2023-05-27
Attending: EMERGENCY MEDICINE
Payer: MEDICAID

## 2023-05-27 VITALS
WEIGHT: 134 LBS | TEMPERATURE: 98 F | DIASTOLIC BLOOD PRESSURE: 59 MMHG | HEART RATE: 85 BPM | OXYGEN SATURATION: 98 % | BODY MASS INDEX: 26.17 KG/M2 | RESPIRATION RATE: 18 BRPM | SYSTOLIC BLOOD PRESSURE: 126 MMHG

## 2023-05-27 DIAGNOSIS — J32.9 SINUSITIS, UNSPECIFIED CHRONICITY, UNSPECIFIED LOCATION: Primary | ICD-10-CM

## 2023-05-27 PROCEDURE — 99283 EMERGENCY DEPT VISIT LOW MDM: CPT

## 2023-05-27 RX ORDER — LORATADINE 10 MG/1
10 TABLET ORAL DAILY
Qty: 14 TABLET | Refills: 0 | Status: SHIPPED | OUTPATIENT
Start: 2023-05-27 | End: 2023-09-18

## 2023-05-27 RX ORDER — AMOXICILLIN AND CLAVULANATE POTASSIUM 875; 125 MG/1; MG/1
1 TABLET, FILM COATED ORAL 2 TIMES DAILY
Qty: 14 TABLET | Refills: 0 | Status: SHIPPED | OUTPATIENT
Start: 2023-05-27 | End: 2023-06-14 | Stop reason: ALTCHOICE

## 2023-05-27 NOTE — Clinical Note
"Ebonie Dyson"Juan David was seen and treated in our emergency department on 5/27/2023.  She may return to work on 05/28/2023.       If you have any questions or concerns, please don't hesitate to call.       LPN    "

## 2023-05-27 NOTE — ED PROVIDER NOTES
Encounter Date: 5/27/2023       History     Chief Complaint   Patient presents with    Sinus Problem     Sinus pain x3 days, no fever.     Pt is a 22 y.o. female who presents to the [facility] ED complaining of sinus pain x 3 days. Denies chest pain, SOB, weakness, dizziness, fever, or loss of bowel or bladder control. Denies relief with OTC medications.     Review of patient's allergies indicates:  No Known Allergies  Past Medical History:   Diagnosis Date    Asthma      No past surgical history on file.  No family history on file.  Social History     Tobacco Use    Smoking status: Former     Types: Cigarettes, Vaping with nicotine    Smokeless tobacco: Never    Tobacco comments:     Quit x 3 wks ago.   Substance Use Topics    Alcohol use: Never    Drug use: Never     Review of Systems   Constitutional:  Negative for chills, diaphoresis, fatigue and fever.   HENT:  Positive for sinus pressure, sinus pain and sore throat. Negative for facial swelling, rhinorrhea and trouble swallowing.    Respiratory:  Negative for cough, chest tightness, shortness of breath and wheezing.    Cardiovascular:  Negative for chest pain, palpitations and leg swelling.   Gastrointestinal:  Negative for abdominal pain, diarrhea, nausea and vomiting.   Genitourinary:  Negative for dysuria, flank pain, frequency, hematuria and urgency.   Musculoskeletal:  Negative for arthralgias, back pain, joint swelling and myalgias.   Skin:  Negative for color change and rash.   Neurological:  Negative for dizziness, syncope, weakness and light-headedness.   Hematological:  Does not bruise/bleed easily.   All other systems reviewed and are negative.    Physical Exam     Initial Vitals [05/27/23 0917]   BP Pulse Resp Temp SpO2   (!) 126/59 85 18 98.4 °F (36.9 °C) 98 %      MAP       --         Physical Exam    Nursing note and vitals reviewed.  Constitutional: She appears well-developed and well-nourished.   HENT:   Head: Normocephalic and atraumatic.   Nose:  Mucosal edema and rhinorrhea present. Right sinus exhibits maxillary sinus tenderness. Left sinus exhibits maxillary sinus tenderness.   Mouth/Throat: Oropharynx is clear and moist.   Erythema to bilateral nares.      Eyes: Conjunctivae and EOM are normal. Pupils are equal, round, and reactive to light.   Neck: Neck supple.   Normal range of motion.  Cardiovascular:  Normal rate, regular rhythm, normal heart sounds and intact distal pulses.           Pulmonary/Chest: Effort normal and breath sounds normal. No respiratory distress. She has no wheezes. She has no rhonchi. She has no rales. She exhibits no tenderness.   Abdominal: Abdomen is soft and flat. Bowel sounds are normal. She exhibits no distension. There is no abdominal tenderness. There is no rebound, no guarding, no tenderness at McBurney's point and negative Acosta's sign. negative psoas sign  Musculoskeletal:         General: Normal range of motion.      Cervical back: Normal range of motion and neck supple.     Neurological: She is alert and oriented to person, place, and time. She has normal strength and normal reflexes.   Skin: Skin is warm and dry. Capillary refill takes less than 2 seconds.   Psychiatric: She has a normal mood and affect. Her speech is normal and behavior is normal. Judgment and thought content normal.       ED Course   Procedures  Labs Reviewed - No data to display       Imaging Results    None          Medications - No data to display  Medical Decision Making:   Differential Diagnosis:   Sinusitis    ED Management:  9:25 AM Reassessed patient at this time. Reports condition has improved. Discussed with patient all pertinent ED information and results. Discussed diagnosis and treatment plan with patient. Follow up instructions and return to ED instruction have been given. All questions and concerns were addressed at this time. Patient voices understanding of information and instructions. Patient is comfortable with plan and  discharge. Patient is stable for discharge.                           Clinical Impression:   Final diagnoses:  [J32.9] Sinusitis, unspecified chronicity, unspecified location (Primary)        ED Disposition Condition    Discharge Stable          ED Prescriptions       Medication Sig Dispense Start Date End Date Auth. Provider    loratadine (CLARITIN) 10 mg tablet Take 1 tablet (10 mg total) by mouth once daily. for 14 days 14 tablet 5/27/2023 6/10/2023 Cooper Padilla Jr., ILIANA    amoxicillin-clavulanate 875-125mg (AUGMENTIN) 875-125 mg per tablet Take 1 tablet by mouth 2 (two) times daily. 14 tablet 5/27/2023 -- LIIANA Cummings Jr.          Follow-up Information       Follow up With Specialties Details Why Contact Info    [facility] - Emergency Dept Emergency Medicine In 3 days As needed, If symptoms worsen [address] [address] [phone]             ILIANA Cummings Jr.  05/27/23 0903

## 2023-05-29 ENCOUNTER — PATIENT OUTREACH (OUTPATIENT)
Dept: EMERGENCY MEDICINE | Facility: HOSPITAL | Age: 23
End: 2023-05-29
Payer: MEDICAID

## 2023-05-31 ENCOUNTER — HOSPITAL ENCOUNTER (EMERGENCY)
Facility: HOSPITAL | Age: 23
Discharge: HOME | End: 2023-05-31
Attending: STUDENT IN AN ORGANIZED HEALTH CARE EDUCATION/TRAINING PROGRAM
Payer: MEDICAID

## 2023-05-31 VITALS
WEIGHT: 134.5 LBS | SYSTOLIC BLOOD PRESSURE: 105 MMHG | HEART RATE: 91 BPM | RESPIRATION RATE: 16 BRPM | TEMPERATURE: 98 F | HEIGHT: 60 IN | OXYGEN SATURATION: 100 % | DIASTOLIC BLOOD PRESSURE: 71 MMHG | BODY MASS INDEX: 26.41 KG/M2

## 2023-05-31 DIAGNOSIS — Z34.92 SECOND TRIMESTER PREGNANCY: Primary | ICD-10-CM

## 2023-05-31 LAB
APPEARANCE UR: ABNORMAL
B-HCG UR QL: POSITIVE
BACTERIA #/AREA URNS AUTO: ABNORMAL /HPF
BILIRUB UR QL STRIP.AUTO: NEGATIVE MG/DL
COLOR UR: YELLOW
CTP QC/QA: YES
GLUCOSE UR QL STRIP.AUTO: NORMAL MG/DL
HYALINE CASTS #/AREA URNS LPF: ABNORMAL /LPF
KETONES UR QL STRIP.AUTO: ABNORMAL MG/DL
LEUKOCYTE ESTERASE UR QL STRIP.AUTO: 25 UNIT/L
MUCOUS THREADS URNS QL MICRO: ABNORMAL /LPF
NITRITE UR QL STRIP.AUTO: NEGATIVE
PH UR STRIP.AUTO: 7 [PH]
PROT UR QL STRIP.AUTO: ABNORMAL MG/DL
RBC #/AREA URNS AUTO: ABNORMAL /HPF
RBC UR QL AUTO: NEGATIVE UNIT/L
SP GR UR STRIP.AUTO: 1.03
SQUAMOUS #/AREA URNS LPF: ABNORMAL /HPF
UROBILINOGEN UR STRIP-ACNC: NORMAL MG/DL
WBC #/AREA URNS AUTO: ABNORMAL /HPF
WBC CLUMPS UR QL AUTO: ABNORMAL /HPF

## 2023-05-31 PROCEDURE — 81001 URINALYSIS AUTO W/SCOPE: CPT | Performed by: PHYSICIAN ASSISTANT

## 2023-05-31 PROCEDURE — 81025 URINE PREGNANCY TEST: CPT | Performed by: PHYSICIAN ASSISTANT

## 2023-05-31 PROCEDURE — 99282 EMERGENCY DEPT VISIT SF MDM: CPT

## 2023-05-31 NOTE — ED PROVIDER NOTES
Encounter Date: 2023     History     Chief Complaint   Patient presents with    Abdominal Cramping     23 WK OB REPORTS LOWER ABD CRAMPS X 1 WK.  NO PAIN AT PRESENT.  DENIES VAG DC, NO BLEEDING OR DYSURIA REPORTED.  +FETAL MOVEMENT. REPORTS CURRENTLY BEING TX FOR SINUS INFECTION W ANTIBIOTICS.       patient who is 23 weeks pregnant presents today for evaluation. Patient says she was recently seen in the ED for sinus congestion and was dx with sinusitis. A representative from the hospital called her yesterday to see how she was feeling and how her experience was. The patient told the representative she had not felt the baby kick in a while so it was recommended she go to the ED for evaluation. Patient says she has been experiencing mild intermittent lower abdominal cramping for a few days, but denies pain at present time. Cramping does not feel like contractions. She feels well right now. Just felt baby kick. She is followed by [facility] at [facility]. Denies contractions, fever, chills, dysuria, hematuria, abnormal vaginal discharge, vaginal bleeding, loss of fluid, diarrhea, constipation, nausea, vomiting.     The history is provided by the patient. No  was used.   Review of patient's allergies indicates:  No Known Allergies  Past Medical History:   Diagnosis Date    Asthma      History reviewed. No pertinent surgical history.  History reviewed. No pertinent family history.  Social History     Tobacco Use    Smoking status: Former     Types: Cigarettes, Vaping with nicotine    Smokeless tobacco: Never    Tobacco comments:     Quit x 3 wks ago.   Substance Use Topics    Alcohol use: Never    Drug use: Never     Review of Systems   Constitutional:  Negative for chills and fever.   Respiratory:  Negative for cough, chest tightness and shortness of breath.    Cardiovascular:  Negative for chest pain, palpitations and leg swelling.   Gastrointestinal:  Negative for constipation,  diarrhea, nausea and vomiting.        Abdominal cramping   Genitourinary:  Negative for dysuria, flank pain, hematuria, pelvic pain, vaginal bleeding, vaginal discharge and vaginal pain.   Musculoskeletal:  Negative for arthralgias and myalgias.   Skin:  Negative for rash.   Neurological:  Negative for syncope, light-headedness and headaches.   All other systems reviewed and are negative.    Physical Exam     Initial Vitals [05/31/23 0814]   BP Pulse Resp Temp SpO2   105/71 91 16 97.9 °F (36.6 °C) 100 %      MAP       --         Physical Exam    Nursing note and vitals reviewed.  Constitutional: She appears well-developed and well-nourished. She is not diaphoretic. No distress.   HENT:   Head: Normocephalic and atraumatic.   Mouth/Throat: Oropharynx is clear and moist. No oropharyngeal exudate.   Eyes: Conjunctivae and EOM are normal.   Neck: Neck supple.   Normal range of motion.  Cardiovascular:  Normal rate, regular rhythm, normal heart sounds and intact distal pulses.           Pulmonary/Chest: Breath sounds normal. No respiratory distress.   Abdominal: Abdomen is soft. Bowel sounds are normal. She exhibits no distension. There is no abdominal tenderness.   Gravid abomen. FHTs present at 160bpm.  There is no rebound and no guarding.   Musculoskeletal:         General: No edema. Normal range of motion.      Cervical back: Normal range of motion and neck supple.     Neurological: She is alert and oriented to person, place, and time. GCS score is 15. GCS eye subscore is 4. GCS verbal subscore is 5. GCS motor subscore is 6.   Skin: Skin is warm and dry. Capillary refill takes less than 2 seconds. No rash noted.   Psychiatric: She has a normal mood and affect.     ED Course   Procedures  Labs Reviewed   URINALYSIS, REFLEX TO URINE CULTURE - Abnormal; Notable for the following components:       Result Value    Appearance, UA Turbid (*)     Protein, UA Trace (*)     Ketones, UA Trace (*)     Leukocyte Esterase, UA 25  (*)     WBC Clumps, UA Trace (*)     Squamous Epithelial Cells, UA Few (*)     Mucous, UA Trace (*)     All other components within normal limits   POCT URINE PREGNANCY - Abnormal; Notable for the following components:    POC Preg Test, Ur Positive (*)     All other components within normal limits          Imaging Results    None          Medications - No data to display  Medical Decision Making:   History:   Old Records Summarized: records from clinic visits and other records.  Initial Assessment:   23 week pregnant patient with intermittent lower abdominal cramping   Differential Diagnosis:   UTI, gastroenteritis, constipation, uterine contractions  Clinical Tests:   Lab Tests: Reviewed  The following lab test(s) were unremarkable: Urinalysis  Patient is non-toxic appearing. Vitals stable. Reassuring FHTs today. Patient felt baby kick during exam. She continues to deny any pain at present time. UA reviewed and + for leukocyte esterase and wbcs. Will follow cultures before prescribing abx (she is currently taking a 7 day course of Augmentin and is essentially asymptomatic at present time). I have advised her to f/u with OB. She is stable for discharge. ED precautions given.      APC / Resident Notes:   I was not physically present during the history, exam or disposition of this patient. I was available at all times for consultation. (Zmora)               Clinical Impression:   Final diagnoses:  [Z34.92] Second trimester pregnancy (Primary)        ED Disposition Condition    Discharge Stable          ED Prescriptions    None       Follow-up Information       Follow up With Specialties Details Why Contact Info Additional Information    [facility] - Emergency Dept Emergency Medicine  If symptoms worsen return to ED immediately [address] [facility] [address] [phone]     [facility] Family [facility] Family Medicine In 2 days  [address] [facility]  [address]  [phone] [facility] [address]             JUAN DANIEL Dodd  05/31/23 0906       Washington Robles MD  06/01/23 0035

## 2023-05-31 NOTE — FIRST PROVIDER EVALUATION
Medical screening examination initiated.  I have conducted a focused provider triage encounter, findings are as follows:    Brief history of present illness:  23 weeks pregnant w/ lower abdominal cramps    There were no vitals filed for this visit.    Pertinent physical exam:  VSS, NAD    Brief workup plan:  UA, UPT, obtain FHTs    Preliminary workup initiated; this workup will be continued and followed by the physician or advanced practice provider that is assigned to the patient when roomed.

## 2023-05-31 NOTE — PROGRESS NOTES
"Identity verified.  Pt states the sinus pain has improved.  She states she has filled and is taking the medications as prescribed.  She denies any questions about the medications prescribed or ED discharge instructions.  Pt denies vaginal bleeding or leakage.  She states she occasionally has upper abdominal cramps.  Pt states she has not felt the baby move since last pm.  Instructed to go to the ED immediately to be evaluated.  Pt states she has been moving around a lot today cleaning house.  Pt states she has "a machine to hear the baby's heart beat" and asked if she could use that before going to the ED.  Again stressed the importance of going immediately to the ED to be evaluated.  Instructed would call tomorrow to follow up.  Patient verbalized understanding.    5/30/2023 0859 Attempted to complete assessment.  Person hung up when asking for patient.  5/31/2023 [id number] Identity verified.  Patient presented to the ED this am for abdominal cramping.  She states the cramping has improved.  She denies any vaginal bleeding, edema, but endorses positive fetal movement.   in the ED.  Pt denies any questions about ED discharge instructions.  Pt has no PCP.  Educated on the benefits of having a PCP.  Appointment to establish care with a PCP will be made after delivery.  Labor precautions given.  Educated on the benefits of eating a healthy diet, drinking plenty of water, prenatal care, when/where to get medical care.  Pt states she needs a dental provider in this area.  Educated on the benefits of oral cleanings every 6 months.  Offered to mail [facility] oral health education/resources, patient accepted. Pt denies SOB.    Patient denies any SDOH barriers at this time. Stressed the importance of medication compliance, keeping appointments and instructed on the use of urgent care clinic for non emergent/non pregnancy issues until PCP established.  Patient verbalized understanding to all instructions.     Appointment " Reminder 6/12/2023  Follow up 7/5/2023    Appointments   PCP Visit Upcoming :  No    Appointment Date/Time :     PCP Visit Upcoming Reason :   PCP Visit Within Year :   No  PCP Visit Within Year Reason:      PCP Visit One Year Date :    Follow-Up Specialist Appt Scheduled : Yes   Type of Specialist :     OB 6/14/2023 0800  Follow-Up Lab Appt Scheduled :  No  Follow-Up Date :      Follow-Up Radiology Appt Scheduled :  No  Radiology Orders :      Providers Patient Visited Last Year :    Dentist  [facility]  Family Medicine - OB

## 2023-05-31 NOTE — PATIENT INSTRUCTIONS
If you have any questions call Sheila [phone].           Why Should I Have My Own Doctor or Nurse Practitioner (PCP) to Take Care of Me  What is a PCP (Primary Care Provider)?    A primary care provider is a doctor or nurse practitioner who you can call for an appointment and will see you when you are sick.    You will also be seen at scheduled appointment times during the year to check on your diabetes, or high blood pressure, or heart disease.    Why see the same PCP (doctor/nurse practitioner)?    You can be seen faster when you are sick           You, the PCP (doctor/nurse practitioner) and the office staff get to know each other; you begin to trust them to care for you. You take part in your health choices.   All of you together are a team.    Your medicine is looked at every time you visit, to be sure you are taking the medicine, as the PCP (doctor/nurse practitioner) ordered.    Your PCP (doctor/nurse practitioner) and their staff help keep you healthy and out of the hospital.  They can catch sicknesses earlier by ordering tests once a year to stop or prevent the sickness from getting worse.      Your PCP (doctor/nurse practitioner) can send you to providers who specialize (heart/bone/lung) if you need.  They and their office staff help keep track of your seeing other providers (doctors/nurse practitioners) and tests (CT/ MRIs/ X Rays)) taken.        PCPs want you to stay healthy.  Let us care for you.                       What Do I Do If I Wake Up Sick                                                     If you wake up sick, or you start to fill sick during the day, try these tips to get care and start to feel better soon.                                                                                                                              As soon as you start to feel bad, call your doctor's office and ask for same day or next day visit appointment.        If you cannot be seen with your doctor's  office within 24 hours, you can go to an urgent care and be seen.          How to stay well:     Take your medicine as ordered by your doctor      Fill your Medicine before you run out      Exercise       Enjoy walking in the sunlight daily  Keep your scheduled clinic appointments      Keep you scheduled yearly wellness visits                Budget-Friendly Healthy Eating    Save money at the grocery store and eat healthy.   Buy groceries keeping your budget in mind  Make a grocery list and only buy what you have on the list  Eat food you cook or have at home; limit fast food or eating out    Compare food labels  Look at store brand food labels and compare to brand names, often food value is the same and the store brand is cheaper      Look for products that do not have sugar, fat, or salt (sodium) added.  These often cost the same but are healthier for you.  They may be labeled as:  ?Sugar-free.  ?Nonfat.              ?Low-fat.  ?Sodium-free.  ?Low sodium.  Look for lean ground beef labeled as at least 92% lean and 8% fat.        Shopping    Buy only the items on your grocery list and go only to the areas of the store that have the items on your list.  Use coupons only for foods and brands you normally buy. Avoid buying items you wouldn't normally buy simply because they are on sale.  Check online and in newspapers for weekly deals.  Buy healthy items from the bulk bins when available, such as herbs, spices, flour, pasta, nuts, and dried fruit.  Buy fruits and vegetables that are in season. Prices are usually lower on in-season produce.  Look at the unit price on the price tag. Use it to compare different brands and sizes to find out which item is the best deal.  Choose healthy items that are often low-cost, such as carrots, potatoes, apples, bananas, and oranges. Dried or canned beans are a low-cost protein source.      Buy in bulk and freeze extra food. Items you can buy in bulk include meats, fish, poultry,  "frozen fruits, and frozen vegetables.  Avoid buying "ready-to-eat" foods, such as pre-cut fruits and vegetables and pre-made salads.  If possible, shop around to discover where you can find the best prices. Consider other retailers such as dollar stores, larger wholesale stores, local fruit and vegetable stands, and farmers markets.  Do not shop when you are hungry. If you shop while hungry, it may be hard to stick to your list and budget.      Resist impulse buying. Use your grocery list as your official plan for the week.      Buy a variety of vegetables and fruits by purchasing fresh, frozen, and canned items.  Look at the top and bottom shelves for deals. Foods at eye level (eye level of an adult or child) are usually more expensive.  Be efficient with your time when shopping. The more time you spend at the store, the more money you are likely to spend.  To save money when choosing more expensive foods like meats and dairy:  ?Choose cheaper cuts of meat, such as bone-in chicken thighs and drumsticks instead of skinless and boneless chicken. When you are ready to prepare the chicken, you can remove the skin yourself to make it healthier.  ?Choose lean meats like chicken or turkey instead of beef.  ?Choose canned seafood, such as tuna, salmon, or sardines.  ?Buy eggs as a low-cost source of protein.  ?Buy dried beans and peas, such as lentils, split peas, or kidney beans instead of meats. Dried beans and peas are a good alternative source of protein.  ?Buy the larger tubs of yogurt instead of individual-sized containers.                        Choose water instead of sodas and other sweetened beverages.  Avoid buying chips, cookies, and other "junk food." These items are usually expensive and not healthy.  Meal planning  Do not eat out or get fast food. Prepare food at home.  Make a grocery list and make sure to bring it with you to the store.   Plan meals and snacks according to a grocery list and budget you " "create.  Use leftovers in your meal plan for the week.  Look for recipes where you can cook once and make enough food for two meals.  Include budget-friendly meals like stews, casseroles, and stir-more dishes.  Try some meatless meals or try "no cook" meals like salads.  Make sure that half your plate is filled with fruits or vegetables. Choose from fresh, frozen, or canned fruits and vegetables. If eating canned, remember to rinse them before eating. This will remove any excess salt added for packaging.            Summary  Eating healthy on a budget is possible if you plan your meals according to your budget, buy according to your budget and grocery list, and prepare food yourself.   Tips for buying more food on a limited budget include buying generic brands, using coupons only for foods you normally buy, and buying healthy items from the bulk bins when available.  Tips for buying cheaper food to replace expensive food include choosing cheaper, lean cuts of meat, and buying dried beans and peas.    Discuss any question you have with your doctor.                           Why is taking care of your mouth/teeth/gums important?     Your mouth is the opening to your body.  If not kept clean, it can let in sickness to the rest of your body.     Oral Health Care (Dentists)                 City:  Provider Address Phone Number Insurance Plan   [place]:  None available                    Baljinder Muller, [place] [address] [facility] Baljinder BALL [phone]   ADULTS ONLY Medicaid:  HB & AETNA ONLY             [place]         [facility] ([facility]) [address] [phone]  DENTURES ONLY ADULT Medicaid:  HB & AETNA ONLY             [facility] [address] [facility] [phone] All Medicaid/Medicare             Ray Murdock, [place] [address]  [facility] Ray Randle [phone] Medicaid Children only 2 to 21           "   [place]         Lang Franco [address] [place] [phone] Accepts:   LHC, HB, Aetna         [facility] [address] [facility], [place] [phone] Medicare             [facility] [address], [place] [phone] Medicaid Children only 2 to 21             [facility] [address], [place] [phone] Medicaid Children only 2 to 21             [place] [facility]  [address], [place] [phone] Medicaid Children only 5 yrs and younger:  lip/tongue tie             [facility] [address] [facility] [phone] Medicaid Children only 2 to 21          [facility]  [address] [facility]  [phone]           [facility] [address] [facility] [phone] All Medicaid/Medicare :  ADULTS             [facility] [address] [facility] [phone] All Medicaid/Medicare :  ADULTS             [facility] [address] [facility] [phone] Medicaid Children only 2 to 21             [facility]  [address] [place] [phone] Medicaid Children only 2 to 21             Dr. Maria Teresa Zvaala,  [facility] [phone] Medicare for Dentures Only             [facility], [facility] [address] [facility] [address] [phone] All Medicaid/Medicare :   EXCEPT [facility]; ADULTS         Nnamdi Villa [address] [facility] [phone] Accepts:   LHC, HB, Aetna             [address] [phone]  UHC, IHC, HB, Aetna/Medicare :  ADULTS     [facility]; [place]: [phone]  [facility]; [place]: [phone]

## 2023-06-12 ENCOUNTER — PATIENT OUTREACH (OUTPATIENT)
Dept: EMERGENCY MEDICINE | Facility: HOSPITAL | Age: 23
End: 2023-06-12
Payer: MEDICAID

## 2023-06-12 NOTE — PROGRESS NOTES
Identity verified.  Reminded patient of appointment with [facility] OB 6/14/2023 0800.  Pt denies abdominal pain, vaginal bleeding, but endorses fetal movement today.  Patient verbalized understanding.     Follow up 7/5/2023

## 2023-06-14 ENCOUNTER — OFFICE VISIT (OUTPATIENT)
Dept: FAMILY MEDICINE | Facility: CLINIC | Age: 23
End: 2023-06-14
Payer: MEDICAID

## 2023-06-14 VITALS
DIASTOLIC BLOOD PRESSURE: 76 MMHG | TEMPERATURE: 98 F | RESPIRATION RATE: 20 BRPM | HEIGHT: 60 IN | HEART RATE: 100 BPM | WEIGHT: 138.38 LBS | BODY MASS INDEX: 27.17 KG/M2 | OXYGEN SATURATION: 100 % | SYSTOLIC BLOOD PRESSURE: 112 MMHG

## 2023-06-14 DIAGNOSIS — R10.9 ABDOMINAL CRAMPING: ICD-10-CM

## 2023-06-14 DIAGNOSIS — Z3A.25 25 WEEKS GESTATION OF PREGNANCY: Primary | ICD-10-CM

## 2023-06-14 LAB
BILIRUB SERPL-MCNC: NORMAL MG/DL
BLOOD URINE, POC: NORMAL
CLARITY, POC UA: CLEAR
COLOR, POC UA: YELLOW
GLUCOSE UR QL STRIP: NORMAL
KETONES UR QL STRIP: NORMAL
LEUKOCYTE ESTERASE URINE, POC: NORMAL
NITRITE, POC UA: NORMAL
PH, POC UA: 7.5
PROTEIN, POC: NORMAL
SPECIFIC GRAVITY, POC UA: 1.01
UROBILINOGEN, POC UA: 0.2

## 2023-06-14 PROCEDURE — 99214 OFFICE O/P EST MOD 30 MIN: CPT | Mod: PBBFAC

## 2023-06-14 PROCEDURE — 81002 URINALYSIS NONAUTO W/O SCOPE: CPT | Mod: PBBFAC

## 2023-06-14 NOTE — PROGRESS NOTES
[facility] Clinic Note    Subjective:      Patient ID: Ebonie Fall is a 22 y.o. yo  at 25^4WGA by 2nd trimester US. (ANKITA: 22)    Current Concerns:   Recent ED visit for lower abd cramps 2wks ago and decreased FM. No pain at time of presentation and felt FM during exam. Dc'd home for routine f/u. Persisting intermittent cramps for couple hours at a time - located across upper abd and tightness to bilateral sides.    Decreased appetite - drinking 4-5ensures a day to supplement. Mostly due to nausea with vomiting with attempts to eat and 2/2 sensitivity to smells from food. Trialed pyridoxime without improvement. Previously rx'd prevacid for reflux, but did not fill. Attempting smaller, bland meals. Tolerated salad this am    Chronic Issues: h/o migraines, controlled    Gestational History:  (date, GA, length labor, BW, sex, type, anes, place, complications)  - G1: 2019; 38 weeks, , 7 pounds 19 ounces, Female, [facility], no complications  - G2: current    Gyn History:   - LMP: 2022  - Age at menarche: 9 years  - Menstrual hx: regular, 5 pads/day, 5-6 days per period  - History of birth control: Depo provera  - History of STDs and/or Abnormal PAPs: none  - History of prior : no    Past Medical History: none  Surgical History: none  Family History: Dad with sickle cell disease  Social History: does not drink or use illicit drug. Used to smoke marijanua, quit after found out that she was pregnant  Medications: PNV, tylenol, tums    Antepartum specific ROS  - Fetal movements: yes, alot  - Vaginal bleeding: denies  - Vaginal discharge: denies  - Loss of fluid: denies  - Contractions: denies; abd cramps as above hpi  - Headaches: yes, resolved with rest  - Vision changes: denies  - Edema: denies    General ROS  Constitutional: no fever, no chills, no weight loss.  CV: no swelling, no edema, no chest pain.  : no urinary retention, no urinary incontinence, denies  dysuria.  GI: no fecal incontinence, no constipation, no diarrhea, nausea and vomiting as above  RESP: no SOB, no wheezing, no difficulty breathing.  Psych: no depression, no anxiety     Objective:      /76 (BP Location: Right arm, Patient Position: Sitting, BP Method: Medium (Automatic))   Pulse 100   Temp 97.9 °F (36.6 °C) (Oral)   Resp 20   Ht 5' (1.524 m)   Wt 62.8 kg (138 lb 6.4 oz)   LMP 12/22/2022 (Approximate)   SpO2 100%   BMI 27.03 kg/m²   Physical Exam:  Gen: alert and oriented, NAD  Resp: CTA bilaterally, nonlabored breathing  CV: RRR, no murmurs, no edema  Abd: gravid, nontender, +BS  - FHTs: 150s bpm  - Fundal height: 25cm    Ultrasound:  Initial US 3/30/23  Single intrauterine gestation with average ultrasound age of 14 weeks 5 days as described.  Detailed fetal anatomic survey is recommended as an outpatient under OB supervision at 18-20 weeks gestation.    Anatomy Scan 5/11/23  A chinchilla living IUP is identified.   Fetal size is appropriate for established dating.   No fetal structural malformations are identified.   Cervical length by TA scanning is normal.   Placental location is anterior without evidence of previa.     Initial OB Labs:  - Blood Type and Rh: O pos  - Antibody Screen: neg  - CBC H/H: 12.3/35.2  - HIV: NR  - Syphilis Ab: NR  - NG: not detected  - CT: not detected  - HBsAg: NR  - HCVAb: NR  - Rubella: neg - non-immune  - Varicella: negative - non-immune  - UA & Culture: no growth on culture  - Sickle Cell Screen: neg  - PAP: NIL, reactive changes  - Influenza vaccine date: out of season    15-20 Weeks Lab:  - Quad Screen: normal risk    28 Week Lab:  - 1H GTT:   - Rhogam:   - Date of Tdap:   - CBC H/H:   - Syphilis Ab:   - HIV:   - Offer and complete BTL form: MUST SIGN BY THIS VISIT     36 Week Lab:  - CBC H/H:   - Syphilis Ab:   - HIV:   - GBS Culture:   - Cervical GC:       Current Outpatient Medications:     lansoprazole (PREVACID) 15 MG capsule, Take 1 capsule  (15 mg total) by mouth once daily., Disp: 30 capsule, Rfl: 3    loratadine (CLARITIN) 10 mg tablet, Take 1 tablet (10 mg total) by mouth once daily. for 14 days, Disp: 14 tablet, Rfl: 0    prenatal 21-iron fu-folic acid (PRENATAL COMPLETE) 14 mg iron- 400 mcg Tab, Take 1 tablet by mouth once daily., Disp: 30 tablet, Rfl: 2     Lab Results   Component Value Date    COLORU Yellow 06/14/2023    SPECGRAV 1.015 06/14/2023    PHUR 7.5 06/14/2023    WBCUR neg 06/14/2023    NITRITE neg 06/14/2023    PROTEINPOC neg 06/14/2023    GLUCOSEUR neg 06/14/2023    KETONESU neg 06/14/2023    UROBILINOGEN 0.2 06/14/2023    BILIRUBINPOC neg 06/14/2023    RBCUR neg 06/14/2023        Assessment/Plan:       1. 25 weeks gestation of pregnancy    2. Abdominal cramping      - OB protocol  - cont PNVs  - Urine Dipstick reviewed: wnl  - Routine labs reviewed  - Strict labor and ED precautions discussed in depth: Fever, vaginal bleeding or leaking fluid, belly cramping or pain, shortness of breath, chest pain, swelling of the face, hands, ankles, feet, or leg; decreased fetal movement, changes in vision, severe headache that does not resolve with rest or Tylenol.    RTC 3 wks for 28wga visit and labs    Will get abd US to assess cramping sxs, ddx include gallbladder etiology. Consider assess LFTs on next bloodwork

## 2023-06-20 ENCOUNTER — HOSPITAL ENCOUNTER (OUTPATIENT)
Dept: RADIOLOGY | Facility: HOSPITAL | Age: 23
Discharge: HOME | End: 2023-06-20
Attending: STUDENT IN AN ORGANIZED HEALTH CARE EDUCATION/TRAINING PROGRAM
Payer: MEDICAID

## 2023-06-20 DIAGNOSIS — R10.9 ABDOMINAL CRAMPING: ICD-10-CM

## 2023-06-20 PROCEDURE — 76705 ECHO EXAM OF ABDOMEN: CPT | Mod: TC

## 2023-07-10 NOTE — PROGRESS NOTES
Attempted to call pt regarding imaging results. No answer x2, unable to leave vm.    US abd without evidence of hepatic or biliary abnormality.

## 2023-07-11 ENCOUNTER — PATIENT OUTREACH (OUTPATIENT)
Dept: EMERGENCY MEDICINE | Facility: HOSPITAL | Age: 23
End: 2023-07-11
Payer: MEDICAID

## 2023-07-12 ENCOUNTER — PATIENT OUTREACH (OUTPATIENT)
Dept: EMERGENCY MEDICINE | Facility: HOSPITAL | Age: 23
End: 2023-07-12
Payer: MEDICAID

## 2023-07-13 ENCOUNTER — PATIENT OUTREACH (OUTPATIENT)
Dept: EMERGENCY MEDICINE | Facility: HOSPITAL | Age: 23
End: 2023-07-13
Payer: MEDICAID

## 2023-08-04 ENCOUNTER — OFFICE VISIT (OUTPATIENT)
Dept: FAMILY MEDICINE | Facility: CLINIC | Age: 23
End: 2023-08-04
Payer: MEDICAID

## 2023-08-04 VITALS
SYSTOLIC BLOOD PRESSURE: 97 MMHG | OXYGEN SATURATION: 99 % | DIASTOLIC BLOOD PRESSURE: 67 MMHG | TEMPERATURE: 99 F | BODY MASS INDEX: 28.11 KG/M2 | HEART RATE: 86 BPM | WEIGHT: 143.19 LBS | RESPIRATION RATE: 18 BRPM | HEIGHT: 60 IN

## 2023-08-04 DIAGNOSIS — N89.8 VAGINAL DISCHARGE: ICD-10-CM

## 2023-08-04 DIAGNOSIS — Z3A.32 32 WEEKS GESTATION OF PREGNANCY: Primary | ICD-10-CM

## 2023-08-04 DIAGNOSIS — R39.15 URGENCY OF URINATION: ICD-10-CM

## 2023-08-04 LAB
APPEARANCE UR: CLEAR
BACTERIA #/AREA URNS AUTO: ABNORMAL /HPF
BASOPHILS # BLD AUTO: 0.01 X10(3)/MCL
BASOPHILS NFR BLD AUTO: 0.1 %
BILIRUB SERPL-MCNC: NORMAL MG/DL
BILIRUB UR QL STRIP.AUTO: NEGATIVE
BLOOD URINE, POC: NORMAL
C TRACH DNA SPEC QL NAA+PROBE: NOT DETECTED
CLARITY, POC UA: CLEAR
CLUE CELLS VAG QL WET PREP: ABNORMAL
COLOR UR: YELLOW
COLOR, POC UA: YELLOW
EOSINOPHIL # BLD AUTO: 0.11 X10(3)/MCL (ref 0–0.9)
EOSINOPHIL NFR BLD AUTO: 1.4 %
ERYTHROCYTE [DISTWIDTH] IN BLOOD BY AUTOMATED COUNT: 11.6 % (ref 11.5–17)
GLUCOSE 1H P 100 G GLC PO SERPL-MCNC: 110 MG/DL (ref 100–180)
GLUCOSE UR QL STRIP.AUTO: NORMAL
GLUCOSE UR QL STRIP: NORMAL
HCT VFR BLD AUTO: 35 % (ref 37–47)
HGB BLD-MCNC: 11.5 G/DL (ref 12–16)
HIV 1+2 AB+HIV1 P24 AG SERPL QL IA: NONREACTIVE
HYALINE CASTS #/AREA URNS LPF: ABNORMAL /LPF
IMM GRANULOCYTES # BLD AUTO: 0.04 X10(3)/MCL (ref 0–0.04)
IMM GRANULOCYTES NFR BLD AUTO: 0.5 %
KETONES UR QL STRIP.AUTO: NEGATIVE
KETONES UR QL STRIP: NORMAL
LEUKOCYTE ESTERASE UR QL STRIP.AUTO: 75
LEUKOCYTE ESTERASE URINE, POC: NORMAL
LYMPHOCYTES # BLD AUTO: 1.56 X10(3)/MCL (ref 0.6–4.6)
LYMPHOCYTES NFR BLD AUTO: 20.1 %
MCH RBC QN AUTO: 28.8 PG (ref 27–31)
MCHC RBC AUTO-ENTMCNC: 32.9 G/DL (ref 33–36)
MCV RBC AUTO: 87.5 FL (ref 80–94)
MONOCYTES # BLD AUTO: 0.82 X10(3)/MCL (ref 0.1–1.3)
MONOCYTES NFR BLD AUTO: 10.6 %
MUCOUS THREADS URNS QL MICRO: ABNORMAL /LPF
N GONORRHOEA DNA SPEC QL NAA+PROBE: NOT DETECTED
NEUTROPHILS # BLD AUTO: 5.23 X10(3)/MCL (ref 2.1–9.2)
NEUTROPHILS NFR BLD AUTO: 67.3 %
NITRITE UR QL STRIP.AUTO: NEGATIVE
NITRITE, POC UA: NORMAL
NRBC BLD AUTO-RTO: 0 %
PH UR STRIP.AUTO: 6.5 [PH]
PH, POC UA: 7
PLATELET # BLD AUTO: 300 X10(3)/MCL (ref 130–400)
PMV BLD AUTO: 9 FL (ref 7.4–10.4)
PROT UR QL STRIP.AUTO: ABNORMAL
PROTEIN, POC: NORMAL
RBC # BLD AUTO: 4 X10(6)/MCL (ref 4.2–5.4)
RBC #/AREA URNS AUTO: ABNORMAL /HPF
RBC UR QL AUTO: NEGATIVE
SOURCE (OHS): NORMAL
SP GR UR STRIP.AUTO: 1.03
SPECIFIC GRAVITY, POC UA: 1.02
SQUAMOUS #/AREA URNS LPF: ABNORMAL /HPF
T PALLIDUM AB SER QL: NONREACTIVE
T VAGINALIS VAG QL WET PREP: ABNORMAL
UROBILINOGEN UR STRIP-ACNC: ABNORMAL
UROBILINOGEN, POC UA: 0.2
WBC # SPEC AUTO: 7.77 X10(3)/MCL (ref 4.5–11.5)
WBC #/AREA URNS AUTO: ABNORMAL /HPF
WBC #/AREA VAG WET PREP: ABNORMAL
YEAST SPEC QL WET PREP: ABNORMAL

## 2023-08-04 PROCEDURE — 36415 COLL VENOUS BLD VENIPUNCTURE: CPT | Performed by: STUDENT IN AN ORGANIZED HEALTH CARE EDUCATION/TRAINING PROGRAM

## 2023-08-04 PROCEDURE — 90715 TDAP VACCINE 7 YRS/> IM: CPT | Mod: PBBFAC

## 2023-08-04 PROCEDURE — 81001 URINALYSIS AUTO W/SCOPE: CPT | Performed by: STUDENT IN AN ORGANIZED HEALTH CARE EDUCATION/TRAINING PROGRAM

## 2023-08-04 PROCEDURE — 86780 TREPONEMA PALLIDUM: CPT | Performed by: STUDENT IN AN ORGANIZED HEALTH CARE EDUCATION/TRAINING PROGRAM

## 2023-08-04 PROCEDURE — 87210 SMEAR WET MOUNT SALINE/INK: CPT | Performed by: STUDENT IN AN ORGANIZED HEALTH CARE EDUCATION/TRAINING PROGRAM

## 2023-08-04 PROCEDURE — 90471 IMMUNIZATION ADMIN: CPT | Mod: PBBFAC

## 2023-08-04 PROCEDURE — 82950 GLUCOSE TEST: CPT | Performed by: STUDENT IN AN ORGANIZED HEALTH CARE EDUCATION/TRAINING PROGRAM

## 2023-08-04 PROCEDURE — 87591 N.GONORRHOEAE DNA AMP PROB: CPT | Performed by: STUDENT IN AN ORGANIZED HEALTH CARE EDUCATION/TRAINING PROGRAM

## 2023-08-04 PROCEDURE — 81002 URINALYSIS NONAUTO W/O SCOPE: CPT | Mod: 59,PBBFAC | Performed by: STUDENT IN AN ORGANIZED HEALTH CARE EDUCATION/TRAINING PROGRAM

## 2023-08-04 PROCEDURE — 87088 URINE BACTERIA CULTURE: CPT | Performed by: STUDENT IN AN ORGANIZED HEALTH CARE EDUCATION/TRAINING PROGRAM

## 2023-08-04 PROCEDURE — 99214 OFFICE O/P EST MOD 30 MIN: CPT | Mod: PBBFAC | Performed by: STUDENT IN AN ORGANIZED HEALTH CARE EDUCATION/TRAINING PROGRAM

## 2023-08-04 PROCEDURE — 85025 COMPLETE CBC W/AUTO DIFF WBC: CPT | Performed by: STUDENT IN AN ORGANIZED HEALTH CARE EDUCATION/TRAINING PROGRAM

## 2023-08-04 PROCEDURE — 87389 HIV-1 AG W/HIV-1&-2 AB AG IA: CPT | Performed by: STUDENT IN AN ORGANIZED HEALTH CARE EDUCATION/TRAINING PROGRAM

## 2023-08-04 RX ORDER — FAMOTIDINE 20 MG
1 TABLET ORAL DAILY
Qty: 30 TABLET | Refills: 2 | Status: SHIPPED | OUTPATIENT
Start: 2023-08-04 | End: 2023-09-18

## 2023-08-04 RX ADMIN — TETANUS TOXOID, REDUCED DIPHTHERIA TOXOID AND ACELLULAR PERTUSSIS VACCINE, ADSORBED 0.5 ML: 5; 2.5; 8; 8; 2.5 SUSPENSION INTRAMUSCULAR at 09:08

## 2023-08-04 NOTE — PROGRESS NOTES
[facility] Clinic Note    Subjective:      Patient ID: Ebonie Fall is a 22 y.o. yo  at 32^6WGA by 2nd trimester US. (ANKITA: 22)    Current Concerns:   Back and side cramps  - cramps lower back, lower stomach, may get 30min to 1 hr of cramps q3-4 min, but not progressive like labor was in prev pregnancy. Does not last all day. Only notices it when resting after moving around; worse when moved around more    Decreased appetite - mostly resolved. Still supplementing with ensure, but pt eats meals now without vomiting. Has appetite for solid food.    Chronic Issues: h/o migraines, controlled    Gestational History:  (date, GA, length labor, BW, sex, type, anes, place, complications)  - G1: 2019; 38 weeks, , 7 pounds 19 ounces, Female, [facility], no complications  - G2: current    Gyn History:   - LMP: 2022  - Age at menarche: 9 years  - Menstrual hx: regular, 5 pads/day, 5-6 days per period  - History of birth control: Depo provera  - History of STDs and/or Abnormal PAPs: none  - History of prior : no    Past Medical History: none  Surgical History: none  Family History: Dad with sickle cell disease  Social History: does not drink or use illicit drug. Used to smoke marijanua, quit after found out that she was pregnant  Medications: PNV, tylenol, tums    Antepartum specific ROS  - Fetal movements: yes, alot  - Vaginal bleeding: denies  - Vaginal discharge: some white discharge after peeing, not itching, strong water odor, musky, different from before  - Loss of fluid: denies  - Contractions: denies; abd cramps as above hpi.irregular  - Headaches: yes, resolved with rest  - Vision changes: denies  - Edema: denies    General ROS  Constitutional: no fever, no chills, no weight loss.  CV: no swelling, no edema, no chest pain.  : no urinary retention, no urinary incontinence, denies dysuria. Noticing more urgency and frequency today  GI: no fecal incontinence, no  constipation, no diarrhea, nausea and vomiting as above  RESP: no SOB, no wheezing, no difficulty breathing.  Psych: no depression, no anxiety     Objective:      LMP 12/22/2022 (Approximate)   Physical Exam:  Gen: alert and oriented, NAD  Resp: CTA bilaterally, nonlabored breathing  CV: RRR, no murmurs, no edema  Abd: gravid, nontender, +BS  - FHTs: 140s bpm  - Fundal height: 33cm  : external genitalia wnl. 2-3mm hyperpigmented macule on lower L side of introitus, nontender without ulceration or bleed noted on exam. +thin white physiologic appearing discharge. Cervix visualized - appears closed without bleeding. No gross lesions visualized    Ultrasound:  Initial US 3/30/23  Single intrauterine gestation with average ultrasound age of 14 weeks 5 days as described.  Detailed fetal anatomic survey is recommended as an outpatient under OB supervision at 18-20 weeks gestation.    Anatomy Scan 5/11/23  A chinchilla living IUP is identified.   Fetal size is appropriate for established dating.   No fetal structural malformations are identified.   Cervical length by TA scanning is normal.   Placental location is anterior without evidence of previa.     Initial OB Labs:  - Blood Type and Rh: O pos  - Antibody Screen: neg  - CBC H/H: 12.3/35.2  - HIV: NR  - Syphilis Ab: NR  - NG: not detected  - CT: not detected  - HBsAg: NR  - HCVAb: NR  - Rubella: neg - non-immune  - Varicella: negative - non-immune  - UA & Culture: no growth on culture  - Sickle Cell Screen: neg  - PAP: NIL, reactive changes  - Influenza vaccine date: out of season    15-20 Weeks Lab:  - Quad Screen: normal risk    28 Week Lab:  - 1H GTT: obtained today  - Rhogam:   - Date of Tdap: done today  - CBC H/H:   - Syphilis Ab:   - HIV:     36 Week Lab:  - CBC H/H:   - Syphilis Ab:   - HIV:   - GBS Culture:   - Cervical GC:       Current Outpatient Medications:     lansoprazole (PREVACID) 15 MG capsule, Take 1 capsule (15 mg total) by mouth once daily., Disp:  30 capsule, Rfl: 3    loratadine (CLARITIN) 10 mg tablet, Take 1 tablet (10 mg total) by mouth once daily. for 14 days, Disp: 14 tablet, Rfl: 0    prenatal 21-iron fu-folic acid (PRENATAL COMPLETE) 14 mg iron- 400 mcg Tab, Take 1 tablet by mouth once daily., Disp: 30 tablet, Rfl: 2     Lab Results   Component Value Date    COLORU Yellow 08/04/2023    SPECGRAV 1.025 08/04/2023    PHUR 7.0 08/04/2023    WBCUR trace 08/04/2023    NITRITE neg 08/04/2023    PROTEINPOC 30 mg 08/04/2023    GLUCOSEUR neg 08/04/2023    KETONESU neg 08/04/2023    UROBILINOGEN 0.2 08/04/2023    BILIRUBINPOC neg 08/04/2023    RBCUR neg 08/04/2023        Assessment/Plan:       1. 32 weeks gestation of pregnancy    2. Urgency of urination    3. Vaginal discharge      Pt returned for labs later in afternoon; reported having slept and drank 2 bottles of water with resolution of cramping in that time. Cramping likely 2/2 dehydration. Spec gravity 1.025 at time of visit. Discussed importance of fluid hydration    Discharge appears physiologic, however, given concern for new onset, obtained GC/CT, wet prep    - OB protocol  - cont PNVs  - Urine Dipstick reviewed: trace WBC. Given sxs of urgency with onset today, will sent urine for UA and Ucx  - Routine labs reviewed  - Strict labor and ED precautions discussed in depth: Fever, vaginal bleeding or leaking fluid, belly cramping or pain, shortness of breath, chest pain, swelling of the face, hands, ankles, feet, or leg; decreased fetal movement, changes in vision, severe headache that does not resolve with rest or Tylenol.    RTC 2wks for routine prenatal care

## 2023-08-06 LAB — BACTERIA UR CULT: NORMAL

## 2023-08-11 NOTE — PROGRESS NOTES
Patient was discussed with the resident on the DOS.   Services were provided at a teaching facility.   I was immediately available during the encounter.   I have reviewed the resident's HPI and PE.   The plan and management are reasonable and appropriate.

## 2023-08-15 ENCOUNTER — HOSPITAL ENCOUNTER (EMERGENCY)
Facility: HOSPITAL | Age: 23
Discharge: HOME | End: 2023-08-15
Attending: OBSTETRICS & GYNECOLOGY
Payer: MEDICAID

## 2023-08-15 VITALS
DIASTOLIC BLOOD PRESSURE: 52 MMHG | BODY MASS INDEX: 28.66 KG/M2 | WEIGHT: 146 LBS | HEIGHT: 60 IN | SYSTOLIC BLOOD PRESSURE: 93 MMHG | RESPIRATION RATE: 18 BRPM | TEMPERATURE: 98 F | HEART RATE: 80 BPM

## 2023-08-15 PROCEDURE — 99284 EMERGENCY DEPT VISIT MOD MDM: CPT

## 2023-08-15 NOTE — ED PROVIDER NOTES
ROMMEL NOTE  [facility]     Admit Date: 8/15/2023  ROMMEL Physician: Michelle James  Primary OBGYN:     Admit Diagnosis/Chief Complaint: Abdominal Pain and Back Pain  Discharge Diagnosis:  discomforts of pregnancy, dehydration    Chief Complaint   Patient presents with    Back Pain       Hospital Course:  Ebonie Fall is a 22 y.o.  at 34w3d presents complaining of low back and abdominal discomfort.   This IUP is complicated by BMI 28.    Patient denies vaginal bleeding, leakage of fluid, contractions, headache, vision changes, RUQ pain, dysuria, fever, and nausea/vomiting.  Fetal Movement: normal.    BP (!) 93/52   Pulse 80   Temp 97.7 °F (36.5 °C) (Oral)   Resp 18   Ht 5' (1.524 m)   Wt 66.2 kg (146 lb)   LMP 2022 (Approximate)   BMI 28.51 kg/m²   Temp:  [97.7 °F (36.5 °C)] 97.7 °F (36.5 °C)  Pulse:  [80] 80  Resp:  [18] 18  BP: (93)/(52) 93/52    General: in no apparent distress alert oriented times 3 afebrile  Cardiovascular: regular rate and rhythm no murmurs  Respiratory: unlabored  Abdominal: soft, nontender, nondistended, no abnormal masses, no epigastric pain obese FHT present  Back: lumbar tenderness absent CVA tenderness none suprapubic tenderness absent  Extremeties no redness or tenderness in the calves or thighs edema 1+    SVE (PeriWATCH)  Dilation (cm): 0  Cervical Position: Posterior  Examined by:: DR. JAMES  Chaperone in room: RN   Normal leukorrhea on glove      EFM: Cat 1, 145 modBTV, +accel, no decel (reassuring, reactive)  TOCO: none      Medical Decision Making:      LABS:   No results found for this or any previous visit (from the past 24 hour(s)).    Imaging Results    None          ASSESMENT and clinical impression: Ebonie Fall is a 22 y.o.   at 34w3d with discomforts of pregnancy and dehydration    Discharge Diagnosis/clinical impression:   Patient Active Problem List   Diagnosis    Cannabis abuse     Nondisplaced fracture of coronoid process of right ulna, subsequent encounter for closed fracture with routine healing       Status:Improved    Disposition:  discharged to home    Medications:   IV fluids, offered tylenol    Patient Instructions:   - Pt was given routine pregnancy instructions including to return to triage if she had any vaginal bleeding (other than spotting for the next 48hrs), any loss of fluid like her water broke, decreased fetal movement, or contractions Q 5min lasting for 2 or more hours. Pt was also instructed to drink copious water. Patient voiced understanding of all these instructions and was subsequently discharged home. Tylenol use and maternity belt use discussed. All questions answered. Pt left ROMMEL with good understanding of plan.   Preeclampsia/ROM/labor/fever/decreased FM with C precautions discussed, voiced understanding     She will follow up with her primary OB as scheduled    Michelle Sanchez MD  OB/GYN Hospitalist  9:43 AM 08/15/2023

## 2023-08-18 ENCOUNTER — OFFICE VISIT (OUTPATIENT)
Dept: FAMILY MEDICINE | Facility: CLINIC | Age: 23
End: 2023-08-18
Payer: MEDICAID

## 2023-08-18 VITALS
HEIGHT: 60 IN | BODY MASS INDEX: 28.23 KG/M2 | WEIGHT: 143.81 LBS | TEMPERATURE: 98 F | DIASTOLIC BLOOD PRESSURE: 67 MMHG | RESPIRATION RATE: 18 BRPM | OXYGEN SATURATION: 100 % | HEART RATE: 84 BPM | SYSTOLIC BLOOD PRESSURE: 100 MMHG

## 2023-08-18 DIAGNOSIS — J30.1 SEASONAL ALLERGIC RHINITIS DUE TO POLLEN: ICD-10-CM

## 2023-08-18 DIAGNOSIS — Z3A.34 34 WEEKS GESTATION OF PREGNANCY: Primary | ICD-10-CM

## 2023-08-18 LAB
BILIRUB SERPL-MCNC: NEGATIVE MG/DL
BLOOD URINE, POC: NEGATIVE
CLARITY, POC UA: CLEAR
COLOR, POC UA: NORMAL
GLUCOSE UR QL STRIP: NEGATIVE
KETONES UR QL STRIP: NEGATIVE
LEUKOCYTE ESTERASE URINE, POC: NEGATIVE
NITRITE, POC UA: NEGATIVE
PH, POC UA: 8.5
PROTEIN, POC: NORMAL
SPECIFIC GRAVITY, POC UA: 1.02
UROBILINOGEN, POC UA: 0.2

## 2023-08-18 PROCEDURE — 99213 OFFICE O/P EST LOW 20 MIN: CPT | Mod: PBBFAC

## 2023-08-18 PROCEDURE — 81002 URINALYSIS NONAUTO W/O SCOPE: CPT | Mod: PBBFAC

## 2023-08-18 NOTE — LETTER
August 18, 2023    Ebonie Fall  [address] [place]  [address]  [place] [address]             [facility]  Family Medicine  [address]  [place] [facility] [address]  Phone: [phone]   August 18, 2023     Patient: Ebonie Fall   YOB: 2000   Date of Visit: 8/18/2023       To Whom it May Concern:    Ebonie Fall was seen in my clinic on 8/18/2023. She may return to work on 08/19/2023 with a 5lb lifting restriction .    Please excuse her from any work missed.    If you have any questions or concerns, please don't hesitate to call.    Sincerely,         Demar Dodson MD

## 2023-08-18 NOTE — PROGRESS NOTES
[facility]MOB Clinic Note    Subjective:      Patient ID: Ebonie Fall is a 22 y.o. yo  at 32^6WGA by 2nd trimester US. (ANKITA: 23)    Current Concerns:   Allergic rhinitis   Has been taking loratadine 10 mg daily, which has helped  States her nasal discharge is clear and watery  Also admits to mild sore throat   Denies fever, chills, dysphagia, change in appetite    Vaginal discharge  Previously seen in clinic on 2023   UA w/culture, Wet prep, GC/CT were negative  denies fever, chills, flank pain, dysuria, hematuria     Abdominal cramps  Recently seen at [facility] ED on 08/15/2023 for lower abdominal/pelvic cramps   Patient was ultimately discharged after receiving IV fluids and Tylenol and instructed to keep hydrated with ED precautions give  She states she has intermittent abdominal cramps that are lasting less than 30 seconds, irregular/intermittent     Chronic Issues: h/o migraines, controlled  - has not used tobacco/Cannabis during this pregnancy    Gestational History:  (date, GA, length labor, BW, sex, type, anes, place, complications)  - G1: 2019; 38 weeks, , 7 pounds 19 ounces, Female, [facility], no complications  - G2: current    Gyn History:   - LMP: 2022  - Age at menarche: 9 years  - Menstrual hx: regular, 5 pads/day, 5-6 days per period  - History of birth control: Depo provera  - History of STDs and/or Abnormal PAPs: none  - History of prior : no    Past Medical History: none  Surgical History: none  Family History: Dad with sickle cell disease  Social History: does not drink or use illicit drug. Used to smoke marijanua, quit after found out that she was pregnant  Medications: PNV, tylenol, tums    Antepartum specific ROS  - Fetal movements: yes, alot  - Vaginal bleeding: denies  - Vaginal discharge: some white discharge after peeing, not itching, strong water odor, musky, different from before  - Loss of fluid: denies  - Contractions: denies; abd cramps  as above hpi.irregular  - Headaches: yes, resolved with rest  - Vision changes: denies  - Edema: denies    General ROS  Constitutional: no fever, no chills, no weight loss.  CV: no swelling, no edema, no chest pain.  : no urinary retention, no urinary incontinence, denies dysuria. Noticing more urgency and frequency today  GI: no fecal incontinence, no constipation, no diarrhea, nausea and vomiting as above  RESP: no SOB, no wheezing, no difficulty breathing.  Psych: no depression, no anxiety     Objective:      /67 (BP Location: Right arm, Patient Position: Sitting, BP Method: Medium (Automatic))   Pulse 84   Temp 97.7 °F (36.5 °C) (Oral)   Resp 18   Ht 5' (1.524 m)   Wt 65.2 kg (143 lb 12.8 oz)   LMP 12/22/2022 (Approximate)   SpO2 100%   BMI 28.08 kg/m²   Physical Exam:  Gen: alert and oriented, NAD  Resp: CTA bilaterally, nonlabored breathing  CV: RRR, no murmurs, no edema  Abd: gravid, nontender, +BS  - FHTs: 140s bpm  - Fundal height: 35cm    Ultrasound:  Initial US 3/30/23  Single intrauterine gestation with average ultrasound age of 14 weeks 5 days as described.  Detailed fetal anatomic survey is recommended as an outpatient under OB supervision at 18-20 weeks gestation.    Anatomy Scan 5/11/23  A chinchilla living IUP is identified.   Fetal size is appropriate for established dating.   No fetal structural malformations are identified.   Cervical length by TA scanning is normal.   Placental location is anterior without evidence of previa.     Initial OB Labs: 04/13/2023  - Blood Type and Rh: O pos  - Antibody Screen: neg  - CBC H/H: 12.3/35.2  - HIV: NR  - Syphilis Ab: NR  - NG: not detected  - CT: not detected  - HBsAg: NR  - HCVAb: NR  - Rubella: neg - non-immune  - Varicella: negative - non-immune  - UA & Culture: no growth on culture  - Sickle Cell Screen: neg  - PAP: NIL, reactive changes  - Influenza vaccine date: out of season    15-20 Weeks Lab:  04/13/2023  - Quad Screen: normal  risk    28 Week Lab: 08/04/2023  - 1H GTT: 110  - Rhogam: not indicated  - Date of Tdap: done today  - CBC H/H: 11.5/35  - Syphilis Ab: Neg  - HIV: Neg  - BTL: signed on 08/18/2023 @ 34^6 WGA    36 Week Lab:  - CBC H/H:   - Syphilis Ab:   - HIV:   - GBS Culture:   - Cervical GC:       Current Outpatient Medications:     lansoprazole (PREVACID) 15 MG capsule, Take 1 capsule (15 mg total) by mouth once daily., Disp: 30 capsule, Rfl: 3    loratadine (CLARITIN) 10 mg tablet, Take 1 tablet (10 mg total) by mouth once daily. for 14 days, Disp: 14 tablet, Rfl: 0    prenatal 21-iron fu-folic acid (PRENATAL COMPLETE) 14 mg iron- 400 mcg Tab, Take 1 tablet by mouth once daily., Disp: 30 tablet, Rfl: 2     Lab Results   Component Value Date    COLORU Dark Yellow 08/18/2023    SPECGRAV 1.020 08/18/2023    PHUR 8.5 08/18/2023    WBCUR negative 08/18/2023    NITRITE negative 08/18/2023    PROTEINPOC trace 08/18/2023    GLUCOSEUR negative 08/18/2023    KETONESU negative 08/18/2023    UROBILINOGEN 0.2 08/18/2023    BILIRUBINPOC negative 08/18/2023    RBCUR negative 08/18/2023        Assessment/Plan:       1. 34 weeks gestation of pregnancy    2. Seasonal allergic rhinitis due to pollen    - OB Protocol, continue PNVs   - Urine dip reviewed as above  - Routine (initial) labs: see above  - Mother plans to breast feed  - Postpartum contraception discussion: BTL signed on 08/18/2023 and uploaded to chart; patient will to come back to [facility] at later date postpartum to have BTL  - Labor precautions discussed in depth; if vaginal bleeding or leaking fluid, belly cramping or pain, shortness of breath-chest pain, swelling of the face-hands, ankles and feet or legs. If don't feel the baby move in over an hour. Change in vision. Severe headache that are not resolved with medication    2) Allergic rhinitus   Informed patient to continue with Loratadine 10 mg qd prn   Encouraged saline flushes for nasal decongestant    3) Abdominal  cramps  Encouraged to increase hydration and food intake  Tylenol 500 mg q8hrs prn for pain      RTC 1 wk for routine prenatal care      Demar Dodson MD   [facility] Family Medicine Resident HO-1  08/18/2023

## 2023-08-21 NOTE — PROGRESS NOTES
I reviewed History, PE, A/P and medical record.  Services provided in outpatient department of a teaching hospital/facility, I was immediately available.  I agree with resident. Care provided was reasonable and necessary.   I discussed the patient with resident at time of visit.  BTL consent signed.

## 2023-08-25 ENCOUNTER — OFFICE VISIT (OUTPATIENT)
Dept: FAMILY MEDICINE | Facility: CLINIC | Age: 23
End: 2023-08-25
Payer: MEDICAID

## 2023-08-25 VITALS
HEART RATE: 110 BPM | DIASTOLIC BLOOD PRESSURE: 74 MMHG | TEMPERATURE: 97 F | BODY MASS INDEX: 28.23 KG/M2 | SYSTOLIC BLOOD PRESSURE: 112 MMHG | HEIGHT: 60 IN | OXYGEN SATURATION: 100 % | WEIGHT: 143.81 LBS | RESPIRATION RATE: 18 BRPM

## 2023-08-25 DIAGNOSIS — O99.519 PREGNANCY RHINITIS: ICD-10-CM

## 2023-08-25 DIAGNOSIS — Z3A.35 35 WEEKS GESTATION OF PREGNANCY: Primary | ICD-10-CM

## 2023-08-25 DIAGNOSIS — J31.0 PREGNANCY RHINITIS: ICD-10-CM

## 2023-08-25 LAB
BASOPHILS # BLD AUTO: 0.01 X10(3)/MCL
BASOPHILS NFR BLD AUTO: 0.1 %
BILIRUB SERPL-MCNC: NEGATIVE MG/DL
BLOOD URINE, POC: NEGATIVE
C TRACH DNA SPEC QL NAA+PROBE: NOT DETECTED
CLARITY, POC UA: NORMAL
COLOR, POC UA: NORMAL
EOSINOPHIL # BLD AUTO: 0.09 X10(3)/MCL (ref 0–0.9)
EOSINOPHIL NFR BLD AUTO: 0.9 %
ERYTHROCYTE [DISTWIDTH] IN BLOOD BY AUTOMATED COUNT: 12 % (ref 11.5–17)
GLUCOSE UR QL STRIP: NEGATIVE
HCT VFR BLD AUTO: 37.2 % (ref 37–47)
HGB BLD-MCNC: 12.2 G/DL (ref 12–16)
HIV 1+2 AB+HIV1 P24 AG SERPL QL IA: NONREACTIVE
IMM GRANULOCYTES # BLD AUTO: 0.03 X10(3)/MCL (ref 0–0.04)
IMM GRANULOCYTES NFR BLD AUTO: 0.3 %
KETONES UR QL STRIP: NEGATIVE
LEUKOCYTE ESTERASE URINE, POC: NEGATIVE
LYMPHOCYTES # BLD AUTO: 1.31 X10(3)/MCL (ref 0.6–4.6)
LYMPHOCYTES NFR BLD AUTO: 13.5 %
MCH RBC QN AUTO: 28.8 PG (ref 27–31)
MCHC RBC AUTO-ENTMCNC: 32.8 G/DL (ref 33–36)
MCV RBC AUTO: 87.9 FL (ref 80–94)
MONOCYTES # BLD AUTO: 0.62 X10(3)/MCL (ref 0.1–1.3)
MONOCYTES NFR BLD AUTO: 6.4 %
N GONORRHOEA DNA SPEC QL NAA+PROBE: NOT DETECTED
NEUTROPHILS # BLD AUTO: 7.62 X10(3)/MCL (ref 2.1–9.2)
NEUTROPHILS NFR BLD AUTO: 78.8 %
NITRITE, POC UA: NEGATIVE
NRBC BLD AUTO-RTO: 0 %
PH, POC UA: 7.5
PLATELET # BLD AUTO: 353 X10(3)/MCL (ref 130–400)
PMV BLD AUTO: 8.9 FL (ref 7.4–10.4)
PROTEIN, POC: NEGATIVE
RBC # BLD AUTO: 4.23 X10(6)/MCL (ref 4.2–5.4)
SOURCE (OHS): NORMAL
SPECIFIC GRAVITY, POC UA: 1.02
T PALLIDUM AB SER QL: NONREACTIVE
UROBILINOGEN, POC UA: 0.2
WBC # SPEC AUTO: 9.68 X10(3)/MCL (ref 4.5–11.5)

## 2023-08-25 PROCEDURE — 99214 OFFICE O/P EST MOD 30 MIN: CPT | Mod: PBBFAC

## 2023-08-25 PROCEDURE — 85025 COMPLETE CBC W/AUTO DIFF WBC: CPT

## 2023-08-25 PROCEDURE — 87389 HIV-1 AG W/HIV-1&-2 AB AG IA: CPT

## 2023-08-25 PROCEDURE — 87081 CULTURE SCREEN ONLY: CPT

## 2023-08-25 PROCEDURE — 87591 N.GONORRHOEAE DNA AMP PROB: CPT

## 2023-08-25 PROCEDURE — 81002 URINALYSIS NONAUTO W/O SCOPE: CPT | Mod: PBBFAC

## 2023-08-25 PROCEDURE — 87491 CHLMYD TRACH DNA AMP PROBE: CPT

## 2023-08-25 PROCEDURE — 36415 COLL VENOUS BLD VENIPUNCTURE: CPT

## 2023-08-25 PROCEDURE — 86780 TREPONEMA PALLIDUM: CPT

## 2023-08-25 NOTE — PROGRESS NOTES
[facility]MOB Clinic Note    Subjective:      Patient ID: Ebonie Fall is a 22 y.o. yo  at 35^6WGA by 2nd trimester US. (ANKITA: 23)    Current Concerns:   Allergic rhinitis   Has been taking loratadine 10 mg daily, which has mildy helped and saline fluses  States her nasal discharge changed from clear to mucous green discoloration  Also admits to nasal congestion, mild sore throat and postnasal drip  Denies fever, chills, dysphagia, change in appetite, recent sick contacts    Chronic Issues: h/o migraines, controlled  - has not used tobacco/Cannabis during this pregnancy    Gestational History:  (date, GA, length labor, BW, sex, type, anes, place, complications)  - G1: 2019; 38 weeks, , 7 pounds 19 ounces, Female, [facility], no complications  - G2: current    Gyn History:   - LMP: 2022  - Age at menarche: 9 years  - Menstrual hx: regular, 5 pads/day, 5-6 days per period  - History of birth control: Depo provera  - History of STDs and/or Abnormal PAPs: none  - History of prior : no    Past Medical History: none  Surgical History: none  Family History: Dad with sickle cell disease  Social History: does not drink or use illicit drug. Used to smoke marijanua, quit after found out that she was pregnant  Medications: PNV, tylenol, tums    Antepartum specific ROS  - Fetal movements: yes  - Vaginal bleeding: denies  - Vaginal discharge: denies  - Loss of fluid: denies  - Contractions: denies  - Headaches: denies  - Vision changes: denies  - Edema: denies    General ROS  Constitutional: no fever, no chills, no weight loss.  CV: no swelling, no edema, no chest pain.  : no urinary retention, no urinary incontinence, denies dysuria. Noticing more urgency and frequency today  GI: no fecal incontinence, no constipation, no diarrhea, nausea and vomiting as above  RESP: no SOB, no wheezing, no difficulty breathing.  Psych: no depression, no anxiety     Objective:      /74 (BP  Location: Left arm, Patient Position: Sitting, BP Method: Medium (Automatic))   Pulse 110   Temp 97.4 °F (36.3 °C) (Oral)   Resp 18   Ht 5' (1.524 m)   Wt 65.2 kg (143 lb 12.8 oz)   LMP 12/22/2022 (Approximate)   SpO2 100%   BMI 28.08 kg/m²   Physical Exam:  Gen: alert and oriented, NAD  Resp: clear to ascultation in all lung fields, no wheezing, nonlabored breathing  CV: RRR, no murmurs  Abd: gravid, nontender, +BS  - FHTs: 150s bpm  - Fundal height: 35cm  Skin: no edema    Ultrasound:  Initial US 3/30/23  Single intrauterine gestation with average ultrasound age of 14 weeks 5 days as described.  Detailed fetal anatomic survey is recommended as an outpatient under OB supervision at 18-20 weeks gestation.    Anatomy Scan 5/11/23  A chinchilla living IUP is identified.   Fetal size is appropriate for established dating.   No fetal structural malformations are identified.   Cervical length by TA scanning is normal.   Placental location is anterior without evidence of previa.     Initial OB Labs: 04/13/2023  - Blood Type and Rh: O pos  - Antibody Screen: neg  - CBC H/H: 12.3/35.2  - HIV: NR  - Syphilis Ab: NR  - NG: not detected  - CT: not detected  - HBsAg: NR  - HCVAb: NR  - Rubella: neg - non-immune  - Varicella: negative - non-immune  - UA & Culture: no growth on culture  - Sickle Cell Screen: neg  - PAP: NIL, reactive changes  - Influenza vaccine date: out of season    15-20 Weeks Lab:  04/13/2023  - Quad Screen: normal risk    28 Week Lab: 08/04/2023  - 1H GTT: 110  - Rhogam: not indicated  - Date of Tdap: done today  - CBC H/H: 11.5/35  - Syphilis Ab: Neg  - HIV: Neg  - BTL: signed on 08/18/2023 @ 34^6 WGA    36 Week Lab: 08/25/2023  - CBC H/H: 12.2/37.2  - Syphilis Ab: NR  - HIV: Neg  - GBS Culture: pending  - Cervical GC: not detected      Current Outpatient Medications:     lansoprazole (PREVACID) 15 MG capsule, Take 1 capsule (15 mg total) by mouth once daily., Disp: 30 capsule, Rfl: 3    loratadine  (CLARITIN) 10 mg tablet, Take 1 tablet (10 mg total) by mouth once daily. for 14 days, Disp: 14 tablet, Rfl: 0    prenatal 21-iron fu-folic acid (PRENATAL COMPLETE) 14 mg iron- 400 mcg Tab, Take 1 tablet by mouth once daily., Disp: 30 tablet, Rfl: 2     Lab Results   Component Value Date    COLORU Dark Yellow 08/25/2023    SPECGRAV 1.025 08/25/2023    PHUR 7.5 08/25/2023    WBCUR negative 08/25/2023    NITRITE negative 08/25/2023    PROTEINPOC negative 08/25/2023    GLUCOSEUR negative 08/25/2023    KETONESU negative 08/25/2023    UROBILINOGEN 0.2 08/25/2023    BILIRUBINPOC negative 08/25/2023    RBCUR negative 08/25/2023        Assessment/Plan:       1. 35 weeks gestation of pregnancy    - OB Protocol, continue PNVs   - Urine dip reviewed as above  - Routine (initial) labs: see above  - Mother plans to breast feed  - Postpartum contraception discussion: BTL signed on 08/18/2023 and uploaded to chart; patient will to come back to [facility] at later date postpartum to have BTL (to be scheduled before discharge)  - ED Labor precautions discussed in depth; if vaginal bleeding or leaking fluid, belly cramping or pain, shortness of breath-chest pain, swelling of the face-hands, ankles and feet or legs. If don't feel the baby move in over an hour. Change in vision. Severe headache that are not resolved with medication    2) Pregnancy rhinitis   Informed patient to continue with Loratadine 10 mg qd prn   Encouraged saline flushes for nasal decongestant  will continue to monitor next week for possible worsening symptoms.         - RTC 1 wk for routine prenatal care      Demar Dodson MD   [facility] Family Medicine Resident HO-1  08/25/2023

## 2023-08-27 LAB — BACTERIA SPEC CULT: NORMAL

## 2023-09-06 ENCOUNTER — OFFICE VISIT (OUTPATIENT)
Dept: FAMILY MEDICINE | Facility: CLINIC | Age: 23
End: 2023-09-06
Payer: MEDICAID

## 2023-09-06 VITALS
WEIGHT: 145 LBS | RESPIRATION RATE: 18 BRPM | OXYGEN SATURATION: 100 % | BODY MASS INDEX: 28.47 KG/M2 | SYSTOLIC BLOOD PRESSURE: 102 MMHG | DIASTOLIC BLOOD PRESSURE: 69 MMHG | TEMPERATURE: 98 F | HEART RATE: 63 BPM | HEIGHT: 60 IN

## 2023-09-06 DIAGNOSIS — Z3A.37 37 WEEKS GESTATION OF PREGNANCY: Primary | ICD-10-CM

## 2023-09-06 LAB
BILIRUB SERPL-MCNC: NORMAL MG/DL
BLOOD URINE, POC: NORMAL
CLARITY, POC UA: CLEAR
COLOR, POC UA: YELLOW
GLUCOSE UR QL STRIP: NORMAL
KETONES UR QL STRIP: 40
LEUKOCYTE ESTERASE URINE, POC: NORMAL
NITRITE, POC UA: NORMAL
PH, POC UA: 7.5
PROTEIN, POC: NORMAL
SPECIFIC GRAVITY, POC UA: 1.02
UROBILINOGEN, POC UA: 0.2

## 2023-09-06 PROCEDURE — 99213 OFFICE O/P EST LOW 20 MIN: CPT | Mod: PBBFAC | Performed by: STUDENT IN AN ORGANIZED HEALTH CARE EDUCATION/TRAINING PROGRAM

## 2023-09-06 PROCEDURE — 81002 URINALYSIS NONAUTO W/O SCOPE: CPT | Mod: PBBFAC | Performed by: STUDENT IN AN ORGANIZED HEALTH CARE EDUCATION/TRAINING PROGRAM

## 2023-09-06 NOTE — PROGRESS NOTES
[facility] FMOB Clinic Note    Subjective:      Patient ID: Ebonie Fall is a 22 y.o. yo  at 37^4WGA by 2nd trimester US. (ANKITA: 22)    Current Concerns: None    Chronic Issues: h/o migraines, controlled  - has not used tobacco/Cannabis during this pregnancy    Gestational History:  (date, GA, length labor, BW, sex, type, anes, place, complications)  - G1: 2019; 38 weeks, , 7 pounds 19 ounces, Female, [facility], no complications  - G2: current    Gyn History:   - LMP: 2022  - Age at menarche: 9 years  - Menstrual hx: regular, 5 pads/day, 5-6 days per period  - History of birth control: Depo provera  - History of STDs and/or Abnormal PAPs: none  - History of prior : no    Past Medical History: none  Surgical History: none  Family History: Dad with sickle cell disease  Social History: does not drink or use illicit drug. Used to smoke marijanua, quit after found out that she was pregnant  Medications: PNV, tylenol, tums, prevacid    Antepartum specific ROS  - Fetal movements: yes  - Vaginal bleeding: denies   - Vaginal discharge: denies  - Loss of fluid: denies  - Contractions: denies  - Headaches: denies  - Vision changes: denies  - Edema: denies    General ROS  Constitutional: no fever, no chills, no weight loss.  CV: no swelling, no edema, no chest pain.  : denies dysuria.   GI:  no constipation, no diarrhea, nausea and vomiting as above  RESP: no SOB, no wheezing, no difficulty breathing.  Psych: no depression, no anxiety     Objective:      /69 (BP Location: Right arm, Patient Position: Sitting, BP Method: Medium (Automatic))   Pulse 63   Temp 98.3 °F (36.8 °C) (Oral)   Resp 18   Ht 5' (1.524 m)   Wt 65.8 kg (145 lb)   LMP 2022 (Approximate)   SpO2 100%   BMI 28.32 kg/m²   Physical Exam:  Gen: alert and oriented, NAD  Resp: clear to ascultation in all lung fields, no wheezing, nonlabored breathing  CV: RRR, no murmurs  Abd: gravid, nontender,  +BS  - FHTs: 147bpm  - Fundal height: 36cm  Cervical exam: Closed    Ultrasound:  Initial US 3/30/23  Single intrauterine gestation with average ultrasound age of 14 weeks 5 days as described.  Detailed fetal anatomic survey is recommended as an outpatient under OB supervision at 18-20 weeks gestation.    Anatomy Scan 5/11/23  A chinchilla living IUP is identified.   Fetal size is appropriate for established dating.   No fetal structural malformations are identified.   Cervical length by TA scanning is normal.   Placental location is anterior without evidence of previa.     Initial OB Labs: 04/13/2023  - Blood Type and Rh: O pos  - Antibody Screen: neg  - CBC H/H: 12.3/35.2  - HIV: NR  - Syphilis Ab: NR  - NG: not detected  - CT: not detected  - HBsAg: NR  - HCVAb: NR  - Rubella: neg - non-immune  - Varicella: negative - non-immune  - UA & Culture: no growth on culture  - Sickle Cell Screen: neg  - PAP: NIL, reactive changes  - Influenza vaccine date: out of season    15-20 Weeks Lab:  04/13/2023  - Quad Screen: normal risk    28 Week Lab: 08/04/2023  - 1H GTT: 110  - Rhogam: not indicated  - Date of Tdap: done today  - CBC H/H: 11.5/35  - Syphilis Ab: Neg  - HIV: Neg  - BTL: signed on 08/18/2023 @ 34^6 WGA    36 Week Lab: 08/25/2023  - CBC H/H: 12.2/37.2  - Syphilis Ab: NR  - HIV: Neg  - GBS Culture: No growth  - Cervical GC: not detected      Current Outpatient Medications:     lansoprazole (PREVACID) 15 MG capsule, Take 1 capsule (15 mg total) by mouth once daily., Disp: 30 capsule, Rfl: 3    loratadine (CLARITIN) 10 mg tablet, Take 1 tablet (10 mg total) by mouth once daily. for 14 days, Disp: 14 tablet, Rfl: 0    prenatal 21-iron fu-folic acid (PRENATAL COMPLETE) 14 mg iron- 400 mcg Tab, Take 1 tablet by mouth once daily., Disp: 30 tablet, Rfl: 2     Lab Results   Component Value Date    COLORU Yellow 09/06/2023    SPECGRAV 1.020 09/06/2023    PHUR 7.5 09/06/2023    WBCUR trace 09/06/2023    NITRITE neg  09/06/2023    PROTEINPOC trace 09/06/2023    GLUCOSEUR neg 09/06/2023    KETONESU 40 09/06/2023    UROBILINOGEN 0.2 09/06/2023    BILIRUBINPOC small 09/06/2023    RBCUR neg 09/06/2023        Assessment/Plan:       1. 37 weeks gestation of pregnancy      - OB Protocol, continue PNVs   - Urine dip reviewed as above  - Routine OB labs reviwed  - Mother plans to breast feed  - Postpartum contraception discussion: BTL signed on 08/18/2023 and uploaded to chart.   - ED Labor precautions discussed in depth; if vaginal bleeding or leaking fluid, belly cramping or pain, shortness of breath-chest pain, swelling of the face-hands, ankles and feet or legs. If don't feel the baby move in over an hour. Change in vision. Severe headache that are not resolved with medication      - RTC 1 wk for routine prenatal care      Maye Pena MD  [employer]  PGY-3  09/06/2023

## 2023-09-07 NOTE — PROGRESS NOTES
I have discussed the case with the resident and reviewed the resident's history and physical, assessment, plan, and progress note. I agree with the findings.       Zach Fernandez MD  [facility]

## 2023-09-11 ENCOUNTER — OFFICE VISIT (OUTPATIENT)
Dept: FAMILY MEDICINE | Facility: CLINIC | Age: 23
End: 2023-09-11
Payer: MEDICAID

## 2023-09-11 VITALS
OXYGEN SATURATION: 99 % | DIASTOLIC BLOOD PRESSURE: 80 MMHG | RESPIRATION RATE: 18 BRPM | BODY MASS INDEX: 28.86 KG/M2 | WEIGHT: 147 LBS | HEART RATE: 79 BPM | HEIGHT: 60 IN | SYSTOLIC BLOOD PRESSURE: 122 MMHG | TEMPERATURE: 98 F

## 2023-09-11 DIAGNOSIS — Z3A.38 38 WEEKS GESTATION OF PREGNANCY: Primary | ICD-10-CM

## 2023-09-11 LAB
BILIRUB SERPL-MCNC: NORMAL MG/DL
BLOOD URINE, POC: NORMAL
CLARITY, POC UA: CLEAR
COLOR, POC UA: YELLOW
GLUCOSE UR QL STRIP: NORMAL
KETONES UR QL STRIP: NORMAL
LEUKOCYTE ESTERASE URINE, POC: NORMAL
NITRITE, POC UA: NORMAL
PH, POC UA: 7
PROTEIN, POC: NORMAL
SPECIFIC GRAVITY, POC UA: 1.02
UROBILINOGEN, POC UA: 0.2

## 2023-09-11 PROCEDURE — 99214 OFFICE O/P EST MOD 30 MIN: CPT | Mod: PBBFAC | Performed by: STUDENT IN AN ORGANIZED HEALTH CARE EDUCATION/TRAINING PROGRAM

## 2023-09-11 PROCEDURE — 81002 URINALYSIS NONAUTO W/O SCOPE: CPT | Mod: PBBFAC | Performed by: STUDENT IN AN ORGANIZED HEALTH CARE EDUCATION/TRAINING PROGRAM

## 2023-09-11 NOTE — PROGRESS NOTES
[facility]MOB Clinic Note    Subjective:      Patient ID: Ebonie Fall is a 22 y.o. yo  at 38^2WGA by 2nd trimester US. (ANKITA: 22)    Current Concerns: None    Chronic Issues: h/o migraines, controlled  - has not used tobacco/Cannabis during this pregnancy    Gestational History:  (date, GA, length labor, BW, sex, type, anes, place, complications)  - G1: 2019; 38 weeks, , 7 pounds 19 ounces, Female, [facility], no complications  - G2: current    Gyn History:   - LMP: 2022  - Age at menarche: 9 years  - Menstrual hx: regular, 5 pads/day, 5-6 days per period  - History of birth control: Depo provera  - History of STDs and/or Abnormal PAPs: none  - History of prior : no    Past Medical History: none  Surgical History: none  Family History: Dad with sickle cell disease  Social History: does not drink or use illicit drug. Used to smoke marijanua, quit after found out that she was pregnant  Medications: PNV, tylenol, tums, prevacid    Antepartum specific ROS  - Fetal movements: yes  - Vaginal bleeding: denies   - Vaginal discharge: denies  - Loss of fluid: denies  - Contractions: denies  - Headaches: denies  - Vision changes: denies  - Edema: denies    General ROS  Constitutional: no fever, no chills  CV: no swelling, no edema, no chest pain.  : denies dysuria.   GI:  no constipation, no diarrhea, nausea and vomiting as above  RESP: no SOB, no wheezing, no difficulty breathing.  Psych: no depression, no anxiety     Objective:      /80 (BP Location: Right arm, Patient Position: Sitting, BP Method: Large (Automatic))   Pulse 79   Temp 97.5 °F (36.4 °C) (Oral)   Resp 18   Ht 5' (1.524 m)   Wt 66.7 kg (147 lb)   LMP 2022 (Approximate)   SpO2 99%   BMI 28.71 kg/m²   Physical Exam:  Gen: alert and oriented, NAD  Resp: clear to ascultation in all lung fields, no wheezing, nonlabored breathing  CV: RRR, no murmurs  Abd: gravid, nontender, +BS  - FHTs: 140 bpm  -  Fundal height: 37.5cm  SVE: 1cm/soft/mid    Ultrasound:  Initial US 3/30/23  Single intrauterine gestation with average ultrasound age of 14 weeks 5 days as described.  Detailed fetal anatomic survey is recommended as an outpatient under OB supervision at 18-20 weeks gestation.    Anatomy Scan 5/11/23  A chinchilla living IUP is identified.   Fetal size is appropriate for established dating.   No fetal structural malformations are identified.   Cervical length by TA scanning is normal.   Placental location is anterior without evidence of previa.     Initial OB Labs: 04/13/2023  - Blood Type and Rh: O pos  - Antibody Screen: neg  - CBC H/H: 12.3/35.2  - HIV: NR  - Syphilis Ab: NR  - NG: not detected  - CT: not detected  - HBsAg: NR  - HCVAb: NR  - Rubella: neg - non-immune  - Varicella: negative - non-immune  - UA & Culture: no growth on culture  - Sickle Cell Screen: neg  - PAP: NIL, reactive changes  - Influenza vaccine date: out of season    15-20 Weeks Lab:  04/13/2023  - Quad Screen: normal risk    28 Week Lab: 08/04/2023  - 1H GTT: 110  - Rhogam: not indicated  - Date of Tdap: done today  - CBC H/H: 11.5/35  - Syphilis Ab: Neg  - HIV: Neg  - BTL: signed on 08/18/2023 @ 34^6 WGA    36 Week Lab: 08/25/2023  - CBC H/H: 12.2/37.2  - Syphilis Ab: NR  - HIV: Neg  - GBS Culture: No growth  - Cervical GC: not detected      Current Outpatient Medications:     lansoprazole (PREVACID) 15 MG capsule, Take 1 capsule (15 mg total) by mouth once daily., Disp: 30 capsule, Rfl: 3    loratadine (CLARITIN) 10 mg tablet, Take 1 tablet (10 mg total) by mouth once daily. for 14 days, Disp: 14 tablet, Rfl: 0    prenatal 21-iron fu-folic acid (PRENATAL COMPLETE) 14 mg iron- 400 mcg Tab, Take 1 tablet by mouth once daily., Disp: 30 tablet, Rfl: 2     Lab Results   Component Value Date    COLORU Yellow 09/11/2023    SPECGRAV 1.020 09/11/2023    PHUR 7.0 09/11/2023    WBCUR trace 09/11/2023    NITRITE neg 09/11/2023    PROTEINPOC neg  09/11/2023    GLUCOSEUR neg 09/11/2023    KETONESU neg 09/11/2023    UROBILINOGEN 0.2 09/11/2023    BILIRUBINPOC neg 09/11/2023    RBCUR neg 09/11/2023        Assessment/Plan:       1. 38 weeks gestation of pregnancy      - OB Protocol, continue PNVs   - Urine dip reviewed as above  - Routine OB labs reviwed  - Mother plans to breast feed  - Postpartum contraception discussion: BTL signed on 08/18/2023 and uploaded to chart.   - ED Labor precautions discussed in depth; if vaginal bleeding or leaking fluid, belly cramping or pain, shortness of breath-chest pain, swelling of the face-hands, ankles and feet or legs. If don't feel the baby move in over an hour. Change in vision. Severe headache that are not resolved with medication      - RTC 1 wk for routine prenatal care and NST      Maye Pena MD  [employer] FM PGY-3  09/11/2023

## 2023-09-18 ENCOUNTER — OFFICE VISIT (OUTPATIENT)
Dept: FAMILY MEDICINE | Facility: CLINIC | Age: 23
End: 2023-09-18
Payer: MEDICAID

## 2023-09-18 VITALS
TEMPERATURE: 98 F | WEIGHT: 147 LBS | OXYGEN SATURATION: 100 % | DIASTOLIC BLOOD PRESSURE: 78 MMHG | SYSTOLIC BLOOD PRESSURE: 116 MMHG | HEART RATE: 99 BPM | BODY MASS INDEX: 28.71 KG/M2

## 2023-09-18 DIAGNOSIS — Z3A.39 39 WEEKS GESTATION OF PREGNANCY: Primary | ICD-10-CM

## 2023-09-18 LAB
BILIRUB SERPL-MCNC: NORMAL MG/DL
BLOOD URINE, POC: NORMAL
CLARITY, POC UA: NORMAL
COLOR, POC UA: NORMAL
GLUCOSE UR QL STRIP: NORMAL
KETONES UR QL STRIP: NORMAL
LEUKOCYTE ESTERASE URINE, POC: NORMAL
NITRITE, POC UA: NORMAL
PH, POC UA: 7.5
PROTEIN, POC: 100
SPECIFIC GRAVITY, POC UA: 1.02
UROBILINOGEN, POC UA: 0.2

## 2023-09-18 PROCEDURE — 99213 OFFICE O/P EST LOW 20 MIN: CPT | Mod: PBBFAC | Performed by: STUDENT IN AN ORGANIZED HEALTH CARE EDUCATION/TRAINING PROGRAM

## 2023-09-18 PROCEDURE — 81002 URINALYSIS NONAUTO W/O SCOPE: CPT | Mod: PBBFAC | Performed by: STUDENT IN AN ORGANIZED HEALTH CARE EDUCATION/TRAINING PROGRAM

## 2023-09-18 RX ORDER — .BETA.-CAROTENE, ASCORBIC ACID, CHOLECALCIFEROL, .ALPHA.-TOCOPHEROL ACETATE, DL-, THIAMINE, RIBOFLAVIN, NIACINAMIDE, PYRIDOXINE HYDROCHLORIDE, FOLIC ACID, CYANOCOBALAMIN, CALCIUM PANTOTHENATE, CALCIUM CARBONATE, FERROUS FUMARATE, ZINC OXIDE AND DOCUSATE SODIUM 1000; 100; 400; 30; 3; 3; 15; 20; 1; 12; 7; 200; 29; 20; 25 [IU]/1; MG/1; [IU]/1; MG/1; MG/1; MG/1; MG/1; MG/1; MG/1; UG/1; MG/1; MG/1; MG/1; MG/1; MG/1
1 TABLET ORAL
Status: ON HOLD | COMMUNITY
Start: 2023-09-01 | End: 2023-09-28 | Stop reason: HOSPADM

## 2023-09-18 NOTE — PROGRESS NOTES
[facility]MOB Clinic Note    Subjective:      Patient ID: Ebonie Fall is a 22 y.o. yo  at 39^2WGA by 2nd trimester US. (ANKITA: 22) here for routine prenatal f/u    Current Concerns: Congestion with rhinorrhea (green mucous), younger daughter 5 yo with URI  symptoms attends     Chronic Issues: h/o migraines, controlled  - has not used tobacco/Cannabis during this pregnancy    Gestational History:  (date, GA, length labor, BW, sex, type, anes, place, complications)  - G1: 2019; 38 weeks, , 7 pounds 19 ounces, Female, [facility], no complications  - G2: current    Gyn History:   - LMP: 2022  - Age at menarche: 9 years  - Menstrual hx: regular, 5 pads/day, 5-6 days per period  - History of birth control: Depo provera  - History of STDs and/or Abnormal PAPs: none  - History of prior : no    Past Medical History: none  Surgical History: none  Family History: Dad with sickle cell disease  Social History: does not drink or use illicit drug. Used to smoke marijanua, quit after found out that she was pregnant  Medications: PNV, tylenol    Antepartum specific ROS  - Fetal movements: yes  - Vaginal bleeding: denies   - Vaginal discharge: denies  - Loss of fluid: denies  - Contractions: denies  - Headaches: denies  - Vision changes: denies  - Edema: yes    General ROS  Constitutional: no fever, no chills  CV: no swelling, no edema, no chest pain.  : denies dysuria.   GI:  no constipation, no diarrhea, nausea and vomiting as above  RESP: no SOB, no wheezing, no difficulty breathing.  Psych: no depression, no anxiety     Objective:      /78 (BP Location: Right arm, Patient Position: Lying, BP Method: Large (Automatic))   Pulse 99   Temp 98.1 °F (36.7 °C) (Oral)   Wt 66.7 kg (147 lb)   LMP 2022 (Approximate)   SpO2 100%   BMI 28.71 kg/m²     Physical Exam:  Gen: alert and oriented, NAD  Resp: clear to ascultation in all lung fields, no wheezing, nonlabored  breathing  CV: RRR, no murmurs  Abd: gravid, nontender, +BS  - FHTs: 140 bpm baseline on NST  - Fundal height: 32cm  SVE: 1cm/soft/mid    Ultrasound:  Initial US 3/30/23  Single intrauterine gestation with average ultrasound age of 14 weeks 5 days as described.  Detailed fetal anatomic survey is recommended as an outpatient under OB supervision at 18-20 weeks gestation.    Anatomy Scan 23  A chinchilla living IUP is identified.   Fetal size is appropriate for established dating.   Presentation: breech   No fetal structural malformations are identified.   Cervical length by TA scanning is normal.   Placental location is anterior without evidence of previa.     Initial OB Labs: 2023  - Blood Type and Rh: O pos  - Antibody Screen: neg  - CBC H/H: 12.3/35.2  - HIV: NR  - Syphilis Ab: NR  - NG: not detected  - CT: not detected  - HBsAg: NR  - HCVAb: NR  - Rubella: neg - non-immune  - Varicella: negative - non-immune  - UA & Culture: no growth on culture  - Sickle Cell Screen: neg  - PAP: NIL, reactive changes  - Influenza vaccine date: out of season    15-20 Weeks Lab:  2023  - Quad Screen: normal risk    28 Week Lab: 2023  - 1H GTT: 110  - Rhogam: not indicated  - Date of Tdap: done today  - CBC H/H: 11.5/35  - Syphilis Ab: Neg  - HIV: Neg  - BTL: signed on 2023 @ 34^6 WGA    36 Week Lab: 2023  - CBC H/H: 12.2/37.2  - Syphilis Ab: NR  - HIV: Neg  - GBS Culture: No growth  - Cervical GC: not detected      Current Outpatient Medications:     SE--19 29 mg iron- 1 mg Tab, Take 1 tablet by mouth., Disp: , Rfl:      Lab Results   Component Value Date    COLORU Dark Yellow 2023    SPECGRAV 1.025 2023    PHUR 7.5 2023    WBCUR Trace 2023    NITRITE neg 2023    PROTEINPOC 100 2023    GLUCOSEUR neg 2023    KETONESU neg 2023    UROBILINOGEN 0.2 2023    BILIRUBINPOC neg 2023    RBCUR neg 2023        Assessment/Plan:       1. 39  weeks gestation of pregnancy      - OB Protocol, continue PNVs   - Urine dip reviewed as above  - Routine OB labs reviewed  - NST reassuring and reactive   - Mother plans to breast feed  - Postpartum contraception discussion: BTL signed on 08/18/2023 and uploaded to chart.   - ED Labor precautions discussed in depth; if vaginal bleeding or leaking fluid, belly cramping or pain, shortness of breath-chest pain, swelling of the face-hands, ankles and feet or legs. If don't feel the baby move in over an hour. Change in vision. Severe headache that are not resolved with medication      - RTC 1 wk for routine prenatal care and NST      Maye Pena MD  U  PGY-3  09/18/2023

## 2023-09-19 NOTE — PROGRESS NOTES
FETAL ASSESSMENT REPORT    RE: Ebonie Fall  MRN:  [id number]  :  2000  AGE:  22 y.o.    Date:  2023    REFERRAL PHYSICIAN: Family Medicine Clinic    Allergies: Patient has no known allergies.    Ebonie is a 22 y.o.  at 39w3d gestational age here today for a NST.    2023, by Ultrasound    MEDICATIONS AT TIME OF TEST:    Current Outpatient Medications   Medication Sig Dispense Refill    SE--19 29 mg iron- 1 mg Tab Take 1 tablet by mouth.       No current facility-administered medications for this visit.       Indication: post-dates pregnancy    Interpretation:  140 BPM baseline    Variability:  Good {> 6 bpm)    Accelerations:  Reactive    Decelerations:  none    Assessment: reactive    Plan:  NST scheduled, NST reactive      Yordy Bergman MD  2023; 1:06 PM     Late note entry, I was available and involved at the time of encounter.

## 2023-09-21 NOTE — PROGRESS NOTES
Faculty Attestation: Patient was seen in [facility] clinic. Patient was seen and examined by the resident.  I have personally reviewed History of the Present Illness , Review of Systems, PFSH , Physical Exam, Assessment and Plan documented by the resident. I was immediately available throughout the encounter.  Group B beta Strep no growth.  Importance of adherence to treatment recommendations discussed with patient at the time of the visit. Services were furnished in a primary care center in the outpatient department at a [facility] hospital. I discussed the patient with the resident and agree with resident's findings and plan as documented in the resident note. Zuly Jiménez MD

## 2023-09-26 ENCOUNTER — ANESTHESIA (OUTPATIENT)
Dept: OBSTETRICS AND GYNECOLOGY | Facility: HOSPITAL | Age: 23
End: 2023-09-26
Payer: MEDICAID

## 2023-09-26 ENCOUNTER — ANESTHESIA EVENT (OUTPATIENT)
Dept: OBSTETRICS AND GYNECOLOGY | Facility: HOSPITAL | Age: 23
End: 2023-09-26
Payer: MEDICAID

## 2023-09-26 ENCOUNTER — HOSPITAL ENCOUNTER (INPATIENT)
Facility: HOSPITAL | Age: 23
LOS: 2 days | Discharge: HOME | End: 2023-09-28
Attending: OBSTETRICS & GYNECOLOGY | Admitting: OBSTETRICS & GYNECOLOGY
Payer: MEDICAID

## 2023-09-26 DIAGNOSIS — Z30.09 UNWANTED FERTILITY: ICD-10-CM

## 2023-09-26 DIAGNOSIS — Z98.51 S/P TUBAL LIGATION: ICD-10-CM

## 2023-09-26 DIAGNOSIS — Z78.9 ADMITTED TO LABOR AND DELIVERY: ICD-10-CM

## 2023-09-26 DIAGNOSIS — F12.10 CANNABIS ABUSE: ICD-10-CM

## 2023-09-26 LAB
AMPHET UR QL SCN: NEGATIVE
APPEARANCE UR: CLEAR
BACTERIA #/AREA URNS AUTO: ABNORMAL /HPF
BARBITURATE SCN PRESENT UR: NEGATIVE
BASOPHILS # BLD AUTO: 0.02 X10(3)/MCL
BASOPHILS NFR BLD AUTO: 0.3 %
BENZODIAZ UR QL SCN: NEGATIVE
BILIRUB UR QL STRIP.AUTO: NEGATIVE
CANNABINOIDS UR QL SCN: NEGATIVE
COCAINE UR QL SCN: NEGATIVE
COLOR UR AUTO: YELLOW
EOSINOPHIL # BLD AUTO: 0.07 X10(3)/MCL (ref 0–0.9)
EOSINOPHIL NFR BLD AUTO: 1 %
ERYTHROCYTE [DISTWIDTH] IN BLOOD BY AUTOMATED COUNT: 14.3 % (ref 11.5–17)
FENTANYL UR QL SCN: NEGATIVE
GLUCOSE UR QL STRIP.AUTO: NEGATIVE
GROUP & RH: NORMAL
HCT VFR BLD AUTO: 41 % (ref 37–47)
HGB BLD-MCNC: 13.3 G/DL (ref 12–16)
IMM GRANULOCYTES # BLD AUTO: 0.03 X10(3)/MCL (ref 0–0.04)
IMM GRANULOCYTES NFR BLD AUTO: 0.4 %
INDIRECT COOMBS GEL: NORMAL
KETONES UR QL STRIP.AUTO: NEGATIVE
LEUKOCYTE ESTERASE UR QL STRIP.AUTO: NEGATIVE
LYMPHOCYTES # BLD AUTO: 1.27 X10(3)/MCL (ref 0.6–4.6)
LYMPHOCYTES NFR BLD AUTO: 17.6 %
MCH RBC QN AUTO: 28.5 PG (ref 27–31)
MCHC RBC AUTO-ENTMCNC: 32.4 G/DL (ref 33–36)
MCV RBC AUTO: 87.8 FL (ref 80–94)
MDMA UR QL SCN: NEGATIVE
MONOCYTES # BLD AUTO: 0.52 X10(3)/MCL (ref 0.1–1.3)
MONOCYTES NFR BLD AUTO: 7.2 %
NEUTROPHILS # BLD AUTO: 5.32 X10(3)/MCL (ref 2.1–9.2)
NEUTROPHILS NFR BLD AUTO: 73.5 %
NITRITE UR QL STRIP.AUTO: NEGATIVE
NRBC BLD AUTO-RTO: 0 %
OPIATES UR QL SCN: NEGATIVE
PCP UR QL: NEGATIVE
PH UR STRIP.AUTO: 7.5 [PH]
PH UR: 7.5 [PH] (ref 3–11)
PLATELET # BLD AUTO: 347 X10(3)/MCL (ref 130–400)
PMV BLD AUTO: 9.1 FL (ref 7.4–10.4)
PROT UR QL STRIP.AUTO: NEGATIVE
RBC # BLD AUTO: 4.67 X10(6)/MCL (ref 4.2–5.4)
RBC #/AREA URNS AUTO: <5 /HPF
RBC UR QL AUTO: NEGATIVE
SP GR UR STRIP.AUTO: 1.02 (ref 1–1.03)
SPECIMEN OUTDATE: NORMAL
SQUAMOUS #/AREA URNS AUTO: 7 /HPF
T PALLIDUM AB SER QL: NONREACTIVE
UROBILINOGEN UR STRIP-ACNC: 1
WBC # SPEC AUTO: 7.23 X10(3)/MCL (ref 4.5–11.5)
WBC #/AREA URNS AUTO: <5 /HPF

## 2023-09-26 PROCEDURE — 59409 PRA ETRICAL CARE,VAG DELIV ONLY: ICD-10-PCS | Mod: QZ,,,

## 2023-09-26 PROCEDURE — 11000001 HC ACUTE MED/SURG PRIVATE ROOM

## 2023-09-26 PROCEDURE — 62326 NJX INTERLAMINAR LMBR/SAC: CPT

## 2023-09-26 PROCEDURE — 25000003 PHARM REV CODE 250

## 2023-09-26 PROCEDURE — 85025 COMPLETE CBC W/AUTO DIFF WBC: CPT | Performed by: OBSTETRICS & GYNECOLOGY

## 2023-09-26 PROCEDURE — 86780 TREPONEMA PALLIDUM: CPT | Performed by: OBSTETRICS & GYNECOLOGY

## 2023-09-26 PROCEDURE — 80307 DRUG TEST PRSMV CHEM ANLYZR: CPT | Performed by: OBSTETRICS & GYNECOLOGY

## 2023-09-26 PROCEDURE — 51702 INSERT TEMP BLADDER CATH: CPT

## 2023-09-26 PROCEDURE — 25000003 PHARM REV CODE 250: Performed by: ANESTHESIOLOGY

## 2023-09-26 PROCEDURE — 63600175 PHARM REV CODE 636 W HCPCS: Performed by: OBSTETRICS & GYNECOLOGY

## 2023-09-26 PROCEDURE — 72200005 HC VAGINAL DELIVERY LEVEL II

## 2023-09-26 PROCEDURE — 99285 EMERGENCY DEPT VISIT HI MDM: CPT | Mod: 25

## 2023-09-26 PROCEDURE — 59409 OBSTETRICAL CARE: CPT | Mod: QZ,,,

## 2023-09-26 PROCEDURE — 86901 BLOOD TYPING SEROLOGIC RH(D): CPT | Performed by: OBSTETRICS & GYNECOLOGY

## 2023-09-26 PROCEDURE — 81001 URINALYSIS AUTO W/SCOPE: CPT | Performed by: OBSTETRICS & GYNECOLOGY

## 2023-09-26 PROCEDURE — 63600175 PHARM REV CODE 636 W HCPCS

## 2023-09-26 PROCEDURE — 72100002 HC LABOR CARE, 1ST 8 HOURS

## 2023-09-26 RX ORDER — PRENATAL WITH FERROUS FUM AND FOLIC ACID 3080; 920; 120; 400; 22; 1.84; 3; 20; 10; 1; 12; 200; 27; 25; 2 [IU]/1; [IU]/1; MG/1; [IU]/1; MG/1; MG/1; MG/1; MG/1; MG/1; MG/1; UG/1; MG/1; MG/1; MG/1; MG/1
1 TABLET ORAL DAILY
Status: DISCONTINUED | OUTPATIENT
Start: 2023-09-27 | End: 2023-09-27

## 2023-09-26 RX ORDER — BUPIVACAINE HYDROCHLORIDE 2.5 MG/ML
INJECTION, SOLUTION INFILTRATION; PERINEURAL CONTINUOUS PRN
Status: DISCONTINUED | OUTPATIENT
Start: 2023-09-26 | End: 2023-09-27

## 2023-09-26 RX ORDER — SODIUM CITRATE AND CITRIC ACID MONOHYDRATE 334; 500 MG/5ML; MG/5ML
SOLUTION ORAL
Status: COMPLETED
Start: 2023-09-26 | End: 2023-09-26

## 2023-09-26 RX ORDER — ONDANSETRON 4 MG/1
8 TABLET, ORALLY DISINTEGRATING ORAL EVERY 8 HOURS PRN
Status: DISCONTINUED | OUTPATIENT
Start: 2023-09-26 | End: 2023-09-27

## 2023-09-26 RX ORDER — OXYTOCIN/RINGER'S LACTATE 30/500 ML
334 PLASTIC BAG, INJECTION (ML) INTRAVENOUS ONCE
Status: DISCONTINUED | OUTPATIENT
Start: 2023-09-26 | End: 2023-09-27

## 2023-09-26 RX ORDER — DIPHENOXYLATE HYDROCHLORIDE AND ATROPINE SULFATE 2.5; .025 MG/1; MG/1
2 TABLET ORAL EVERY 6 HOURS PRN
Status: DISCONTINUED | OUTPATIENT
Start: 2023-09-26 | End: 2023-09-26

## 2023-09-26 RX ORDER — PROCHLORPERAZINE EDISYLATE 5 MG/ML
5 INJECTION INTRAMUSCULAR; INTRAVENOUS EVERY 6 HOURS PRN
Status: DISCONTINUED | OUTPATIENT
Start: 2023-09-26 | End: 2023-09-26

## 2023-09-26 RX ORDER — OXYTOCIN/RINGER'S LACTATE 30/500 ML
95 PLASTIC BAG, INJECTION (ML) INTRAVENOUS ONCE
Status: DISCONTINUED | OUTPATIENT
Start: 2023-09-26 | End: 2023-09-27

## 2023-09-26 RX ORDER — ACETAMINOPHEN 325 MG/1
650 TABLET ORAL EVERY 6 HOURS PRN
Status: DISCONTINUED | OUTPATIENT
Start: 2023-09-26 | End: 2023-09-27

## 2023-09-26 RX ORDER — CARBOPROST TROMETHAMINE 250 UG/ML
250 INJECTION, SOLUTION INTRAMUSCULAR
Status: DISCONTINUED | OUTPATIENT
Start: 2023-09-26 | End: 2023-09-27

## 2023-09-26 RX ORDER — EPHEDRINE SULFATE 50 MG/ML
10 INJECTION, SOLUTION INTRAVENOUS
Status: ACTIVE | OUTPATIENT
Start: 2023-09-26 | End: 2023-09-26

## 2023-09-26 RX ORDER — OXYTOCIN/RINGER'S LACTATE 30/500 ML
334 PLASTIC BAG, INJECTION (ML) INTRAVENOUS ONCE AS NEEDED
Status: COMPLETED | OUTPATIENT
Start: 2023-09-26 | End: 2023-09-26

## 2023-09-26 RX ORDER — MISOPROSTOL 100 UG/1
800 TABLET ORAL ONCE AS NEEDED
Status: DISCONTINUED | OUTPATIENT
Start: 2023-09-26 | End: 2023-09-27

## 2023-09-26 RX ORDER — DIPHENHYDRAMINE HCL 25 MG
25 CAPSULE ORAL EVERY 4 HOURS PRN
Status: DISCONTINUED | OUTPATIENT
Start: 2023-09-26 | End: 2023-09-27

## 2023-09-26 RX ORDER — SODIUM CHLORIDE, SODIUM LACTATE, POTASSIUM CHLORIDE, CALCIUM CHLORIDE 600; 310; 30; 20 MG/100ML; MG/100ML; MG/100ML; MG/100ML
INJECTION, SOLUTION INTRAVENOUS CONTINUOUS
Status: DISCONTINUED | OUTPATIENT
Start: 2023-09-26 | End: 2023-09-27

## 2023-09-26 RX ORDER — OXYTOCIN/RINGER'S LACTATE 30/500 ML
95 PLASTIC BAG, INJECTION (ML) INTRAVENOUS ONCE AS NEEDED
Status: DISCONTINUED | OUTPATIENT
Start: 2023-09-26 | End: 2023-09-27

## 2023-09-26 RX ORDER — OXYTOCIN 10 [USP'U]/ML
10 INJECTION, SOLUTION INTRAMUSCULAR; INTRAVENOUS ONCE AS NEEDED
Status: DISCONTINUED | OUTPATIENT
Start: 2023-09-26 | End: 2023-09-26

## 2023-09-26 RX ORDER — MUPIROCIN 20 MG/G
OINTMENT TOPICAL
Status: CANCELLED | OUTPATIENT
Start: 2023-09-26

## 2023-09-26 RX ORDER — HYDROCODONE BITARTRATE AND ACETAMINOPHEN 5; 325 MG/1; MG/1
1 TABLET ORAL EVERY 4 HOURS PRN
Status: DISCONTINUED | OUTPATIENT
Start: 2023-09-26 | End: 2023-09-27

## 2023-09-26 RX ORDER — PROCHLORPERAZINE EDISYLATE 5 MG/ML
5 INJECTION INTRAMUSCULAR; INTRAVENOUS EVERY 6 HOURS PRN
Status: DISCONTINUED | OUTPATIENT
Start: 2023-09-26 | End: 2023-09-27

## 2023-09-26 RX ORDER — OXYTOCIN 10 [USP'U]/ML
10 INJECTION, SOLUTION INTRAMUSCULAR; INTRAVENOUS ONCE AS NEEDED
Status: DISCONTINUED | OUTPATIENT
Start: 2023-09-26 | End: 2023-09-27

## 2023-09-26 RX ORDER — SIMETHICONE 80 MG
1 TABLET,CHEWABLE ORAL 4 TIMES DAILY PRN
Status: DISCONTINUED | OUTPATIENT
Start: 2023-09-26 | End: 2023-09-26

## 2023-09-26 RX ORDER — HYDROCORTISONE 25 MG/G
CREAM TOPICAL 3 TIMES DAILY PRN
Status: DISCONTINUED | OUTPATIENT
Start: 2023-09-26 | End: 2023-09-27

## 2023-09-26 RX ORDER — SIMETHICONE 80 MG
1 TABLET,CHEWABLE ORAL EVERY 6 HOURS PRN
Status: DISCONTINUED | OUTPATIENT
Start: 2023-09-26 | End: 2023-09-27

## 2023-09-26 RX ORDER — IBUPROFEN 600 MG/1
600 TABLET ORAL EVERY 6 HOURS
Status: DISCONTINUED | OUTPATIENT
Start: 2023-09-26 | End: 2023-09-27

## 2023-09-26 RX ORDER — DIPHENOXYLATE HYDROCHLORIDE AND ATROPINE SULFATE 2.5; .025 MG/1; MG/1
2 TABLET ORAL EVERY 6 HOURS PRN
Status: DISCONTINUED | OUTPATIENT
Start: 2023-09-26 | End: 2023-09-27

## 2023-09-26 RX ORDER — CALCIUM CARBONATE 200(500)MG
500 TABLET,CHEWABLE ORAL 3 TIMES DAILY PRN
Status: DISCONTINUED | OUTPATIENT
Start: 2023-09-26 | End: 2023-09-27

## 2023-09-26 RX ORDER — SODIUM CITRATE AND CITRIC ACID MONOHYDRATE 334; 500 MG/5ML; MG/5ML
30 SOLUTION ORAL ONCE
Status: DISCONTINUED | OUTPATIENT
Start: 2023-09-26 | End: 2023-09-27

## 2023-09-26 RX ORDER — LIDOCAINE HYDROCHLORIDE AND EPINEPHRINE 15; 5 MG/ML; UG/ML
INJECTION, SOLUTION EPIDURAL
Status: DISCONTINUED | OUTPATIENT
Start: 2023-09-26 | End: 2023-09-27

## 2023-09-26 RX ORDER — DIPHENHYDRAMINE HYDROCHLORIDE 50 MG/ML
25 INJECTION INTRAMUSCULAR; INTRAVENOUS EVERY 4 HOURS PRN
Status: DISCONTINUED | OUTPATIENT
Start: 2023-09-26 | End: 2023-09-27

## 2023-09-26 RX ORDER — SODIUM CHLORIDE 0.9 % (FLUSH) 0.9 %
10 SYRINGE (ML) INJECTION
Status: DISCONTINUED | OUTPATIENT
Start: 2023-09-26 | End: 2023-09-27

## 2023-09-26 RX ORDER — METHYLERGONOVINE MALEATE 0.2 MG/ML
200 INJECTION INTRAVENOUS
Status: DISCONTINUED | OUTPATIENT
Start: 2023-09-26 | End: 2023-09-27

## 2023-09-26 RX ORDER — DOCUSATE SODIUM 100 MG/1
200 CAPSULE, LIQUID FILLED ORAL 2 TIMES DAILY PRN
Status: DISCONTINUED | OUTPATIENT
Start: 2023-09-26 | End: 2023-09-27

## 2023-09-26 RX ORDER — OXYTOCIN/RINGER'S LACTATE 30/500 ML
0-30 PLASTIC BAG, INJECTION (ML) INTRAVENOUS CONTINUOUS
Status: DISCONTINUED | OUTPATIENT
Start: 2023-09-26 | End: 2023-09-27

## 2023-09-26 RX ORDER — FENTANYL/BUPIVACAINE/NS/PF 2-1250MCG
PLASTIC BAG, INJECTION (ML) INJECTION CONTINUOUS
Status: DISCONTINUED | OUTPATIENT
Start: 2023-09-26 | End: 2023-09-27

## 2023-09-26 RX ORDER — LIDOCAINE HYDROCHLORIDE 10 MG/ML
10 INJECTION INFILTRATION; PERINEURAL ONCE AS NEEDED
Status: DISCONTINUED | OUTPATIENT
Start: 2023-09-26 | End: 2023-09-27

## 2023-09-26 RX ORDER — OXYTOCIN/RINGER'S LACTATE 30/500 ML
334 PLASTIC BAG, INJECTION (ML) INTRAVENOUS ONCE AS NEEDED
Status: DISCONTINUED | OUTPATIENT
Start: 2023-09-26 | End: 2023-09-27

## 2023-09-26 RX ADMIN — BUPIVACAINE HYDROCHLORIDE 8 ML: 2.5 INJECTION, SOLUTION INFILTRATION; PERINEURAL at 01:09

## 2023-09-26 RX ADMIN — Medication 10 ML/HR: at 01:09

## 2023-09-26 RX ADMIN — SODIUM CITRATE AND CITRIC ACID MONOHYDRATE 30 ML: 500; 334 SOLUTION ORAL at 01:09

## 2023-09-26 RX ADMIN — Medication 4 MILLI-UNITS/MIN: at 12:09

## 2023-09-26 RX ADMIN — SODIUM CHLORIDE, POTASSIUM CHLORIDE, SODIUM LACTATE AND CALCIUM CHLORIDE: 600; 310; 30; 20 INJECTION, SOLUTION INTRAVENOUS at 10:09

## 2023-09-26 RX ADMIN — IBUPROFEN 600 MG: 600 TABLET, FILM COATED ORAL at 11:09

## 2023-09-26 RX ADMIN — LIDOCAINE HYDROCHLORIDE,EPINEPHRINE BITARTRATE 3 ML: 15; .005 INJECTION, SOLUTION EPIDURAL; INFILTRATION; INTRACAUDAL; PERINEURAL at 01:09

## 2023-09-26 RX ADMIN — SODIUM CHLORIDE, POTASSIUM CHLORIDE, SODIUM LACTATE AND CALCIUM CHLORIDE 1000 ML: 600; 310; 30; 20 INJECTION, SOLUTION INTRAVENOUS at 12:09

## 2023-09-26 RX ADMIN — IBUPROFEN 600 MG: 600 TABLET, FILM COATED ORAL at 05:09

## 2023-09-26 RX ADMIN — Medication 334 MILLI-UNITS/MIN: at 02:09

## 2023-09-26 RX ADMIN — LIDOCAINE HYDROCHLORIDE,EPINEPHRINE BITARTRATE 2 ML: 15; .005 INJECTION, SOLUTION EPIDURAL; INFILTRATION; INTRACAUDAL; PERINEURAL at 01:09

## 2023-09-26 RX ADMIN — SODIUM CHLORIDE, POTASSIUM CHLORIDE, SODIUM LACTATE AND CALCIUM CHLORIDE: 600; 310; 30; 20 INJECTION, SOLUTION INTRAVENOUS at 01:09

## 2023-09-26 NOTE — ANESTHESIA PREPROCEDURE EVALUATION
2023  Ebonie Fall is a 22 y.o., female.   at 40w3d, in labor, requesting epidural.      Pre-op Assessment    I have reviewed the Patient Summary Reports.     I have reviewed the Nursing Notes. I have reviewed the NPO Status.   I have reviewed the Medications.     Review of Systems  Anesthesia Hx:  No problems with previous Anesthesia    Social:  No Alcohol Use, Non-Smoker    Hematology/Oncology:  Hematology Normal   Oncology Normal     EENT/Dental:EENT/Dental Normal   Cardiovascular:  Cardiovascular Normal     Pulmonary:   Asthma mild    Renal/:  Renal/ Normal     Hepatic/GI:  Hepatic/GI Normal    Musculoskeletal:  Musculoskeletal Normal    Neurological:  Neurology Normal    Endocrine:  Endocrine Normal    Dermatological:  Skin Normal    Psych:  Psychiatric Normal           Physical Exam  General: Cooperative, Well nourished, Alert and Oriented    Airway:  Mallampati: II   Mouth Opening: Normal  TM Distance: Normal  Tongue: Normal  Neck ROM: Normal ROM        Anesthesia Plan  Type of Anesthesia, risks & benefits discussed:    Anesthesia Type: Epidural  Informed Consent: Informed consent signed with the Patient and all parties understand the risks and agree with anesthesia plan.  All questions answered.   ASA Score: 2  Day of Surgery Review of History & Physical: H&P Update referred to the surgeon/provider.    Ready For Surgery From Anesthesia Perspective.     .

## 2023-09-26 NOTE — PROGRESS NOTES
Labor Progress Note        Subjective:      Patient currently doing well without complaints.     Objective:      Temp:  [97.7 °F (36.5 °C)-98.5 °F (36.9 °C)] 98.5 °F (36.9 °C)  Pulse:  [] 64  Resp:  [17-18] 17  SpO2:  [98 %-100 %] 100 %  BP: (101-136)/(59-89) 124/79  There is no height or weight on file to calculate BMI.     General: no acute distress  Electronic Fetal Monitoring:  FHT: 140 bpm, moderate variability, accelerations present, decelerations absent   Category: 1                 TOCO: Contractions: regular, every 5-6 minutes   Cervix:4/80/-2     Assessment:     1. IUP at  here for induction of labor  2.   Group & Rh   Date Value Ref Range Status   2023 O POS  Final     3. Category 1 FHT     Plan:     1. Continue active management of labor  2. Artificial rupture of membrane performed, continue augmentation of labor with oxytocin  3. Reassuring FHT     Abdirashid Corral MD  [facility] HO-1

## 2023-09-26 NOTE — ED PROVIDER NOTES
ROMMEL NOTE  [facility]     Admit Date: 2023  ROMMEL Physician: Michelle Sanchez  Primary OBGYN:  [facility] residents    Admit Diagnosis/Chief Complaint:  active labor    Chief Complaint   Patient presents with    Abdominal Pain      40.3 week iup with c/o ctx beginning at 0830 this am       Hospital Course:  Ebonie Fall is a 22 y.o.  at 40w3d presents complaining of increasing ctx.  This IUP is complicated by BMI 28.    Patient denies vaginal bleeding, leakage of fluid, headache, vision changes, RUQ pain, dysuria, fever, and nausea/vomiting.  Fetal Movement: normal.    LMP 2022 (Approximate)   Breastfeeding No        General: in no apparent distress oriented times 3 afebrile  Cardiovascular: regular rate and rhythm no murmurs  Respiratory: unlabored  Abdominal: soft, nontender, nondistended, no abnormal masses, no epigastric pain FHT present   EFW 7.5#, palpates vtx  Back: lumbar tenderness absent CVA tenderness none suprapubic tenderness absent  Extremeties no redness or tenderness in the calves or thighs edema trace    SVE (PeriWATCH)  Dilation (cm): 3.5  Effacement (%): 70  Examined by:: RICH Salgado Rn   Proven pelvis      EFM: Cat 1, 155 modBTV, +accel, no decel (reassuring, reactive)  TOCO: not currently on pt      Medical Decision Making:      LABS:      Initial OB Labs: 2023  - Blood Type and Rh: O pos  - Antibody Screen: neg  - CBC H/H: 12.3/35.2  - HIV: NR  - Syphilis Ab: NR  - NG: not detected  - CT: not detected  - HBsAg: NR  - HCVAb: NR  - Rubella: neg - non-immune  - Varicella: negative - non-immune  - UA & Culture: no growth on culture  - Sickle Cell Screen: neg  - PAP: NIL, reactive changes  - Influenza vaccine date: out of season    15-20 Weeks Lab:  2023  - Quad Screen: normal risk    28 Week Lab: 2023  - 1H GTT: 110  - Rhogam: not indicated  - Date of Tdap: done today  - CBC H/H: 11.5/35  - Syphilis Ab: Neg  - HIV:  Neg  - BTL: signed on 2023 @ 34^6 WGA    36 Week Lab: 2023  - CBC H/H: 12.2/37.2  - Syphilis Ab: NR  - HIV: Neg  - GBS Culture: No growth  - Cervical GC: not detected       ASSESMENT and clinical impression: Ebonie Fall is a 22 y.o.   at 40w3d by2nd tri US with active labor    History noted in chart  Patient Active Problem List   Diagnosis    Cannabis abuse    Nondisplaced fracture of coronoid process of right ulna, subsequent encounter for closed fracture with routine healing       Admit to LDR -- OB Hospitalist service  CBC, T&S ordered  Pt desires epidural for pain management, also plans to keep placenta as part of her birth plan  Fetal status overall reassuring  Amniotomy/pitocin PRN  GBS negative    Michelle Sanchez MD  OB/GYN Hospitalist  10:19 AM 2023

## 2023-09-26 NOTE — ANESTHESIA PROCEDURE NOTES
Epidural    Patient location during procedure: OB   Reason for block: primary anesthetic   Reason for block: labor analgesia requested by patient and obstetrician  Diagnosis: Labor   Start time: 9/26/2023 1:10 PM  Timeout: 9/26/2023 1:06 PM  End time: 9/26/2023 1:20 PM    Staffing  Performing Provider: Kun Apple CRNA  Authorizing Provider: Kun Apple CRNA    Staffing  Performed by: Kun Apple CRNA  Authorized by: Kun Apple CRNA        Preanesthetic Checklist  Completed: patient identified, IV checked, site marked, risks and benefits discussed, surgical consent, monitors and equipment checked, pre-op evaluation, timeout performed, anesthesia consent given, hand hygiene performed and patient being monitored  Preparation  Patient position: sitting  Prep: ChloraPrep  Patient monitoring: Pulse Ox and Blood Pressure  Reason for block: primary anesthetic   Epidural  Skin Anesthetic: lidocaine 1%  Skin Wheal: 3 mL  Administration type: continuous  Approach: midline  Interspace: L2-3    Injection technique: ELLEN saline  Needle and Epidural Catheter  Needle type: Tuohy   Needle gauge: 18  Needle length: 5.0 inches  Needle insertion depth: 5 cm  Catheter size: 20 G  Catheter at skin depth: 10 cm  Insertion Attempts: 1  Test dose: 3 mL of lidocaine 1.5% with Epi 1-to-200,000  Additional Documentation: incremental injection, negative aspiration for heme and CSF, no paresthesia on injection, no significant pain on injection, no significant complaints from patient and no signs/symptoms of IV or SA injection  Needle localization: anatomical landmarks  Assessment  Upper dermatomal levels - Left: T8  Right: T8   Dermatomal levels determined by alcohol wipe  Ease of block: easy  Patient's tolerance of the procedure: comfortable throughout block and no complaints No inadvertent dural puncture with Tuohy.  Dural puncture not performed with spinal needle

## 2023-09-26 NOTE — H&P
HISTORY AND PHYSICAL                                                OBSTETRICS          Subjective:      Ebonie Fall is a 22 y.o.  at 40w3d by [address] [place] [address] U/S presents complaining of increasing ctx.  This IUP is complicated by BMI 28. PNC with [facility].  Patient denies vaginal bleeding, leakage of fluid, headache, vision changes, RUQ pain, dysuria, fever, and nausea/vomiting.  Fetal Movement: normal.    PMHx:   Past Medical History:   Diagnosis Date    Asthma        PSHx: No past surgical history on file.    All: Review of patient's allergies indicates:  No Known Allergies    Meds: (Not in a hospital admission)      SH:   Social History     Socioeconomic History    Marital status: Single   Tobacco Use    Smoking status: Former     Types: Cigarettes, Vaping with nicotine    Smokeless tobacco: Never    Tobacco comments:     Quit x 3 wks ago.   Substance and Sexual Activity    Alcohol use: Never    Drug use: Never    Sexual activity: Not Currently     Social Determinants of Health     Food Insecurity: No Food Insecurity (3/6/2023)    Hunger Vital Sign     Worried About Running Out of Food in the Last Year: Never true     Ran Out of Food in the Last Year: Never true   Transportation Needs: No Transportation Needs (3/6/2023)    PRAPARE - Transportation     Lack of Transportation (Medical): No     Lack of Transportation (Non-Medical): No   Housing Stability: Unknown (3/6/2023)    Housing Stability Vital Sign     Unable to Pay for Housing in the Last Year: No     Unstable Housing in the Last Year: No       FH:   Family History   Problem Relation Age of Onset    Sickle cell anemia Father     Diabetes Father        OBHx:   OB History    Para Term  AB Living   2 1 1 0 0 1   SAB IAB Ectopic Multiple Live Births   0 0 0 0 1      # Outcome Date GA Lbr Chaitanya/2nd Weight Sex Delivery Anes PTL Lv   2 Current            1 Term 19 40w0d  3.43 kg (7 lb 9 oz) F Vag-Spont EPI  CARLOS       Objective:       LMP 2022 (Approximate)   Breastfeeding No   There is no height or weight on file to calculate BMI.  VS initiated per RN    General: in no apparent distress oriented times 3 afebrile  Cardiovascular: regular rate and rhythm no murmurs  Respiratory: unlabored  Abdominal: soft, nontender, nondistended, no abnormal masses, no epigastric pain FHT present   EFW 7.5#, palpates vtx  Back: lumbar tenderness absent CVA tenderness none suprapubic tenderness absent  Extremeties no redness or tenderness in the calves or thighs edema trace     SVE (PeriWATCH)  Dilation (cm): 3.5  Effacement (%): 70  Examined by:: RICH Salgado Rn   Proven pelvis        EFM: Cat 1, 155 modBTV, +accel, no decel (reassuring, reactive)  TOCO: not currently on pt    Lab Review  Initial OB Labs: 2023  - Blood Type and Rh: O pos  - Antibody Screen: neg  - CBC H/H: 12.3/35.2  - HIV: NR  - Syphilis Ab: NR  - NG: not detected  - CT: not detected  - HBsAg: NR  - HCVAb: NR  - Rubella: neg - non-immune  - Varicella: negative - non-immune  - UA & Culture: no growth on culture  - Sickle Cell Screen: neg  - PAP: NIL, reactive changes  - Influenza vaccine date: out of season    15-20 Weeks Lab:  2023  - Quad Screen: normal risk    28 Week Lab: 2023  - 1H GTT: 110  - Rhogam: not indicated  - Date of Tdap: done today  - CBC H/H: 11.5/35  - Syphilis Ab: Neg  - HIV: Neg  - BTL: signed on 2023 @ 34^6 WGA    36 Week Lab: 2023  - CBC H/H: 12.2/37.2  - Syphilis Ab: NR  - HIV: Neg  - GBS Culture: No growth  - Cervical GC: not detected        Lab Results   Component Value Date    WBC 9.68 2023    HGB 12.2 2023    HCT 37.2 2023    MCV 87.9 2023     2023           Assessment:     22 y.o.  at 40w3d weeks gestation, active labor, BMI 28       Plan:     1. Risks, benefits, alternatives and possible complications have been discussed in detail with the patient. All questions have been answered, and Ms.  Juan David has voiced understanding and agrees to the treatment plan.  2. Consents signed and in chart  3. Admit to Labor and Delivery unit  4. Trend VS and BP  5. GBS negative  6. Plans epidural, augment PRN with pitocin/amniotomy, fetal status currently reassuring, anticipate   UDS also ordered with admit labs     Michelle Sanchez MD  OB/GYN Hospitalist  10:22 AM 2023

## 2023-09-26 NOTE — L&D DELIVERY NOTE
[facility] - Labor and Delivery  Vaginal Delivery   Operative Note    SUMMARY     Normal spontaneous vaginal delivery of live infant, was placed on mothers abdomen for skin to skin following deep suctioning at warmer.  Infant delivered position OA over intact perineum.  Nuchal cord: No.    Spontaneous delivery of placenta and IV pitocin given noting good uterine tone.  No lacerations noted.  Patient tolerated delivery well. Sponge needle and lap counted correctly x2.    Indications: <principal problem not specified>  Pregnancy complicated by:   Patient Active Problem List   Diagnosis    Cannabis abuse    Nondisplaced fracture of coronoid process of right ulna, subsequent encounter for closed fracture with routine healing     (spontaneous vaginal delivery)     Admitting GA: 40w3d    Delivery Information for Girl Ebonie Fall    Birth information:  YOB: 2024   Time of birth: 2:46 PM   Sex: female   Head Delivery Date/Time: 2023  2:45 PM   Delivery type: Vaginal, Spontaneous   Gestational Age: 40w3d        Delivery Providers    Delivering clinician:            Measurements    Weight:   Length:          Apgars    Living status:   Apgar Component Scores:  1 min.:  5 min.:  10 min.:  15 min.:  20 min.:    Skin color:         Heart rate:         Reflex irritability:         Muscle tone:         Respiratory effort:         Total:                  Operative Delivery    Forceps attempted?: No  Vacuum extractor attempted?: No         Shoulder Dystocia    Shoulder dystocia present?: No           Presentation    Presentation: Vertex  Position: Left Occiput Anterior           Interventions/Resuscitation           Cord    Delayed Cord Clamping?: No  Cord Blood Disposition: Sent with Baby, Lab  Gases Sent?: No  Stem Cell Collection (by MD): No       Placenta    Placenta delivery date/time: 2023 1449  Placenta removal: Spontaneous  Placenta appearance: Intact  Placenta disposition: Family            Labor Events:       labor: No     Labor Onset Date/Time: 2023 08:40     Dilation Complete Date/Time: 2023 14:25     Start Pushing Date/Time: 2023 14:32     Rupture Date/Time: 23  1210         Rupture type: ARM (Artificial Rupture)         Fluid Amount:       Fluid Color: Green, Meconium Thin       steroids: None     Antibiotics given for GBS: No     Induction: none     Indications for induction:        Augmentation: amniotomy;oxytocin     Indications for augmentation: Ineffective Contraction Pattern     Labor complications: None     Additional complications:          Cervical ripening:                     Delivery:      Episiotomy: None     Indication for Episiotomy:       Perineal Lacerations: None Repaired:      Periurethral Laceration:   Repaired:     Labial Laceration:   Repaired:     Sulcus Laceration:   Repaired:     Vaginal Laceration:   Repaired:     Cervical Laceration:   Repaired:     Repair suture: None     Repair # of packets:       Last Value - EBL - Nursing (mL):       Sum - EBL - Nursing (mL): 0     Last Value - EBL - Anesthesia (mL):      Calculated QBL (mL):       Vaginal Sweep Performed: Yes     Surgicount Correct:         Other providers:       Anesthesia    Method: Epidural          Details (if applicable):  Trial of Labor      Categorization:      Priority:     Indications for :     Incision Type:       Additional  information:  Forceps:    Vacuum:    Breech:    Observed anomalies    Other (Comments):         Abdirashid Corral MD  [facility] HO-1

## 2023-09-27 ENCOUNTER — ANESTHESIA EVENT (OUTPATIENT)
Dept: OBSTETRICS AND GYNECOLOGY | Facility: HOSPITAL | Age: 23
End: 2023-09-27
Payer: MEDICAID

## 2023-09-27 ENCOUNTER — ANESTHESIA (OUTPATIENT)
Dept: OBSTETRICS AND GYNECOLOGY | Facility: HOSPITAL | Age: 23
End: 2023-09-27
Payer: MEDICAID

## 2023-09-27 PROBLEM — Z98.51 S/P TUBAL LIGATION: Status: ACTIVE | Noted: 2023-09-27

## 2023-09-27 PROCEDURE — D9220A PRA ANESTHESIA: Mod: ANES,,, | Performed by: ANESTHESIOLOGY

## 2023-09-27 PROCEDURE — 37000008 HC ANESTHESIA 1ST 15 MINUTES: Mod: SZN | Performed by: OBSTETRICS & GYNECOLOGY

## 2023-09-27 PROCEDURE — 63600175 PHARM REV CODE 636 W HCPCS: Performed by: NURSE ANESTHETIST, CERTIFIED REGISTERED

## 2023-09-27 PROCEDURE — D9220A PRA ANESTHESIA: ICD-10-PCS | Mod: CRNA,,, | Performed by: NURSE ANESTHETIST, CERTIFIED REGISTERED

## 2023-09-27 PROCEDURE — 88302 TISSUE EXAM BY PATHOLOGIST: CPT | Mod: SZN | Performed by: OBSTETRICS & GYNECOLOGY

## 2023-09-27 PROCEDURE — 36004723: Mod: SZN | Performed by: OBSTETRICS & GYNECOLOGY

## 2023-09-27 PROCEDURE — 51702 INSERT TEMP BLADDER CATH: CPT

## 2023-09-27 PROCEDURE — 63600175 PHARM REV CODE 636 W HCPCS: Performed by: ANESTHESIOLOGY

## 2023-09-27 PROCEDURE — 25000003 PHARM REV CODE 250

## 2023-09-27 PROCEDURE — 27000492 HC SLEEVE, SCD T/L

## 2023-09-27 PROCEDURE — 71000033 HC RECOVERY, INTIAL HOUR: Performed by: OBSTETRICS & GYNECOLOGY

## 2023-09-27 PROCEDURE — 37000009 HC ANESTHESIA EA ADD 15 MINS: Mod: SZN | Performed by: OBSTETRICS & GYNECOLOGY

## 2023-09-27 PROCEDURE — 36004722: Performed by: OBSTETRICS & GYNECOLOGY

## 2023-09-27 PROCEDURE — 63600175 PHARM REV CODE 636 W HCPCS

## 2023-09-27 PROCEDURE — 25000003 PHARM REV CODE 250: Performed by: ANESTHESIOLOGY

## 2023-09-27 PROCEDURE — D9220A PRA ANESTHESIA: Mod: CRNA,,, | Performed by: NURSE ANESTHETIST, CERTIFIED REGISTERED

## 2023-09-27 PROCEDURE — 25000003 PHARM REV CODE 250: Performed by: NURSE ANESTHETIST, CERTIFIED REGISTERED

## 2023-09-27 PROCEDURE — D9220A PRA ANESTHESIA: ICD-10-PCS | Mod: ANES,,, | Performed by: ANESTHESIOLOGY

## 2023-09-27 PROCEDURE — 11000001 HC ACUTE MED/SURG PRIVATE ROOM

## 2023-09-27 RX ORDER — SODIUM CITRATE AND CITRIC ACID MONOHYDRATE 334; 500 MG/5ML; MG/5ML
30 SOLUTION ORAL ONCE
Status: COMPLETED | OUTPATIENT
Start: 2023-09-27 | End: 2023-09-27

## 2023-09-27 RX ORDER — OXYTOCIN/RINGER'S LACTATE 30/500 ML
334 PLASTIC BAG, INJECTION (ML) INTRAVENOUS ONCE AS NEEDED
Status: DISCONTINUED | OUTPATIENT
Start: 2023-09-27 | End: 2023-09-28 | Stop reason: HOSPADM

## 2023-09-27 RX ORDER — SODIUM CHLORIDE 0.9 % (FLUSH) 0.9 %
10 SYRINGE (ML) INJECTION
Status: DISCONTINUED | OUTPATIENT
Start: 2023-09-27 | End: 2023-09-28 | Stop reason: HOSPADM

## 2023-09-27 RX ORDER — MISOPROSTOL 100 UG/1
800 TABLET ORAL ONCE AS NEEDED
Status: DISCONTINUED | OUTPATIENT
Start: 2023-09-27 | End: 2023-09-28 | Stop reason: HOSPADM

## 2023-09-27 RX ORDER — METHYLERGONOVINE MALEATE 0.2 MG/ML
200 INJECTION INTRAVENOUS
Status: DISCONTINUED | OUTPATIENT
Start: 2023-09-27 | End: 2023-09-27

## 2023-09-27 RX ORDER — ONDANSETRON 2 MG/ML
INJECTION INTRAMUSCULAR; INTRAVENOUS
Status: DISCONTINUED | OUTPATIENT
Start: 2023-09-27 | End: 2023-09-27

## 2023-09-27 RX ORDER — MORPHINE SULFATE 4 MG/ML
4 INJECTION, SOLUTION INTRAMUSCULAR; INTRAVENOUS EVERY 4 HOURS PRN
Status: DISCONTINUED | OUTPATIENT
Start: 2023-09-27 | End: 2023-09-28 | Stop reason: HOSPADM

## 2023-09-27 RX ORDER — OXYTOCIN/RINGER'S LACTATE 30/500 ML
334 PLASTIC BAG, INJECTION (ML) INTRAVENOUS ONCE
Status: DISCONTINUED | OUTPATIENT
Start: 2023-09-27 | End: 2023-09-27

## 2023-09-27 RX ORDER — ACETAMINOPHEN 325 MG/1
650 TABLET ORAL EVERY 6 HOURS PRN
Status: DISCONTINUED | OUTPATIENT
Start: 2023-09-27 | End: 2023-09-28 | Stop reason: HOSPADM

## 2023-09-27 RX ORDER — OXYTOCIN 10 [USP'U]/ML
10 INJECTION, SOLUTION INTRAMUSCULAR; INTRAVENOUS ONCE AS NEEDED
Status: DISCONTINUED | OUTPATIENT
Start: 2023-09-27 | End: 2023-09-28 | Stop reason: HOSPADM

## 2023-09-27 RX ORDER — SIMETHICONE 80 MG
1 TABLET,CHEWABLE ORAL EVERY 6 HOURS PRN
Status: DISCONTINUED | OUTPATIENT
Start: 2023-09-27 | End: 2023-09-28 | Stop reason: HOSPADM

## 2023-09-27 RX ORDER — HYDROCODONE BITARTRATE AND ACETAMINOPHEN 5; 325 MG/1; MG/1
1 TABLET ORAL EVERY 4 HOURS PRN
Status: DISCONTINUED | OUTPATIENT
Start: 2023-09-27 | End: 2023-09-28 | Stop reason: HOSPADM

## 2023-09-27 RX ORDER — DIPHENHYDRAMINE HCL 25 MG
25 CAPSULE ORAL EVERY 4 HOURS PRN
Status: DISCONTINUED | OUTPATIENT
Start: 2023-09-27 | End: 2023-09-28 | Stop reason: HOSPADM

## 2023-09-27 RX ORDER — SODIUM CHLORIDE, SODIUM LACTATE, POTASSIUM CHLORIDE, CALCIUM CHLORIDE 600; 310; 30; 20 MG/100ML; MG/100ML; MG/100ML; MG/100ML
INJECTION, SOLUTION INTRAVENOUS CONTINUOUS
Status: DISCONTINUED | OUTPATIENT
Start: 2023-09-27 | End: 2023-09-27

## 2023-09-27 RX ORDER — DOCUSATE SODIUM 100 MG/1
200 CAPSULE, LIQUID FILLED ORAL 2 TIMES DAILY PRN
Status: DISCONTINUED | OUTPATIENT
Start: 2023-09-27 | End: 2023-09-28 | Stop reason: HOSPADM

## 2023-09-27 RX ORDER — IBUPROFEN 600 MG/1
600 TABLET ORAL EVERY 6 HOURS
Status: DISCONTINUED | OUTPATIENT
Start: 2023-09-27 | End: 2023-09-28 | Stop reason: HOSPADM

## 2023-09-27 RX ORDER — MIDAZOLAM HYDROCHLORIDE 1 MG/ML
INJECTION INTRAMUSCULAR; INTRAVENOUS
Status: DISCONTINUED | OUTPATIENT
Start: 2023-09-27 | End: 2023-09-27

## 2023-09-27 RX ORDER — MISOPROSTOL 100 UG/1
800 TABLET ORAL
Status: DISCONTINUED | OUTPATIENT
Start: 2023-09-27 | End: 2023-09-27

## 2023-09-27 RX ORDER — OXYTOCIN/RINGER'S LACTATE 30/500 ML
95 PLASTIC BAG, INJECTION (ML) INTRAVENOUS ONCE
Status: DISCONTINUED | OUTPATIENT
Start: 2023-09-27 | End: 2023-09-28 | Stop reason: HOSPADM

## 2023-09-27 RX ORDER — GLYCOPYRROLATE 0.2 MG/ML
INJECTION INTRAMUSCULAR; INTRAVENOUS
Status: DISCONTINUED | OUTPATIENT
Start: 2023-09-27 | End: 2023-09-27

## 2023-09-27 RX ORDER — SODIUM CITRATE AND CITRIC ACID MONOHYDRATE 334; 500 MG/5ML; MG/5ML
30 SOLUTION ORAL
Status: DISCONTINUED | OUTPATIENT
Start: 2023-09-27 | End: 2023-09-27

## 2023-09-27 RX ORDER — CEFAZOLIN SODIUM 2 G/50ML
2 SOLUTION INTRAVENOUS
Status: DISCONTINUED | OUTPATIENT
Start: 2023-09-27 | End: 2023-09-27

## 2023-09-27 RX ORDER — CEFAZOLIN SODIUM IN 0.9 % NACL 2 G/100 ML
PLASTIC BAG, INJECTION (ML) INTRAVENOUS
Status: DISCONTINUED | OUTPATIENT
Start: 2023-09-27 | End: 2023-09-27

## 2023-09-27 RX ORDER — OXYTOCIN/RINGER'S LACTATE 30/500 ML
95 PLASTIC BAG, INJECTION (ML) INTRAVENOUS ONCE AS NEEDED
Status: DISCONTINUED | OUTPATIENT
Start: 2023-09-27 | End: 2023-09-28 | Stop reason: HOSPADM

## 2023-09-27 RX ORDER — CARBOPROST TROMETHAMINE 250 UG/ML
250 INJECTION, SOLUTION INTRAMUSCULAR
Status: DISCONTINUED | OUTPATIENT
Start: 2023-09-27 | End: 2023-09-27

## 2023-09-27 RX ORDER — ONDANSETRON 4 MG/1
8 TABLET, ORALLY DISINTEGRATING ORAL EVERY 8 HOURS PRN
Status: DISCONTINUED | OUTPATIENT
Start: 2023-09-27 | End: 2023-09-28 | Stop reason: HOSPADM

## 2023-09-27 RX ORDER — DIPHENHYDRAMINE HYDROCHLORIDE 50 MG/ML
25 INJECTION INTRAMUSCULAR; INTRAVENOUS EVERY 4 HOURS PRN
Status: DISCONTINUED | OUTPATIENT
Start: 2023-09-27 | End: 2023-09-28 | Stop reason: HOSPADM

## 2023-09-27 RX ORDER — CARBOPROST TROMETHAMINE 250 UG/ML
250 INJECTION, SOLUTION INTRAMUSCULAR
Status: DISCONTINUED | OUTPATIENT
Start: 2023-09-27 | End: 2023-09-28 | Stop reason: HOSPADM

## 2023-09-27 RX ORDER — OXYTOCIN/RINGER'S LACTATE 30/500 ML
95 PLASTIC BAG, INJECTION (ML) INTRAVENOUS ONCE
Status: DISCONTINUED | OUTPATIENT
Start: 2023-09-27 | End: 2023-09-27

## 2023-09-27 RX ORDER — HYDROCORTISONE 25 MG/G
CREAM TOPICAL 3 TIMES DAILY PRN
Status: DISCONTINUED | OUTPATIENT
Start: 2023-09-27 | End: 2023-09-28 | Stop reason: HOSPADM

## 2023-09-27 RX ORDER — PROCHLORPERAZINE EDISYLATE 5 MG/ML
5 INJECTION INTRAMUSCULAR; INTRAVENOUS EVERY 6 HOURS PRN
Status: DISCONTINUED | OUTPATIENT
Start: 2023-09-27 | End: 2023-09-28 | Stop reason: HOSPADM

## 2023-09-27 RX ORDER — FAMOTIDINE 10 MG/ML
20 INJECTION INTRAVENOUS
Status: DISCONTINUED | OUTPATIENT
Start: 2023-09-27 | End: 2023-09-27

## 2023-09-27 RX ORDER — DIPHENOXYLATE HYDROCHLORIDE AND ATROPINE SULFATE 2.5; .025 MG/1; MG/1
2 TABLET ORAL EVERY 6 HOURS PRN
Status: DISCONTINUED | OUTPATIENT
Start: 2023-09-27 | End: 2023-09-28 | Stop reason: HOSPADM

## 2023-09-27 RX ORDER — PRENATAL WITH FERROUS FUM AND FOLIC ACID 3080; 920; 120; 400; 22; 1.84; 3; 20; 10; 1; 12; 200; 27; 25; 2 [IU]/1; [IU]/1; MG/1; [IU]/1; MG/1; MG/1; MG/1; MG/1; MG/1; MG/1; UG/1; MG/1; MG/1; MG/1; MG/1
1 TABLET ORAL DAILY
Status: DISCONTINUED | OUTPATIENT
Start: 2023-09-27 | End: 2023-09-28 | Stop reason: HOSPADM

## 2023-09-27 RX ORDER — METHYLERGONOVINE MALEATE 0.2 MG/ML
200 INJECTION INTRAVENOUS
Status: DISCONTINUED | OUTPATIENT
Start: 2023-09-27 | End: 2023-09-28 | Stop reason: HOSPADM

## 2023-09-27 RX ADMIN — MORPHINE SULFATE 4 MG: 4 INJECTION INTRAVENOUS at 11:09

## 2023-09-27 RX ADMIN — IBUPROFEN 600 MG: 600 TABLET ORAL at 12:09

## 2023-09-27 RX ADMIN — GLYCOPYRROLATE 0.1 MG: 0.2 INJECTION INTRAMUSCULAR; INTRAVENOUS at 09:09

## 2023-09-27 RX ADMIN — MIDAZOLAM 2 MG: 1 INJECTION INTRAMUSCULAR; INTRAVENOUS at 09:09

## 2023-09-27 RX ADMIN — DOCUSATE SODIUM 200 MG: 100 CAPSULE, LIQUID FILLED ORAL at 10:09

## 2023-09-27 RX ADMIN — Medication 2 G: at 09:09

## 2023-09-27 RX ADMIN — SODIUM CHLORIDE, POTASSIUM CHLORIDE, SODIUM LACTATE AND CALCIUM CHLORIDE: 600; 310; 30; 20 INJECTION, SOLUTION INTRAVENOUS at 08:09

## 2023-09-27 RX ADMIN — SODIUM CHLORIDE, SODIUM GLUCONATE, SODIUM ACETATE, POTASSIUM CHLORIDE AND MAGNESIUM CHLORIDE: 526; 502; 368; 37; 30 INJECTION, SOLUTION INTRAVENOUS at 09:09

## 2023-09-27 RX ADMIN — IBUPROFEN 600 MG: 600 TABLET ORAL at 05:09

## 2023-09-27 RX ADMIN — HYDROCODONE BITARTRATE AND ACETAMINOPHEN 1 TABLET: 5; 325 TABLET ORAL at 06:09

## 2023-09-27 RX ADMIN — SIMETHICONE 80 MG: 80 TABLET, CHEWABLE ORAL at 11:09

## 2023-09-27 RX ADMIN — ONDANSETRON 4 MG: 2 INJECTION INTRAMUSCULAR; INTRAVENOUS at 09:09

## 2023-09-27 RX ADMIN — SODIUM CITRATE AND CITRIC ACID MONOHYDRATE 30 ML: 500; 334 SOLUTION ORAL at 08:09

## 2023-09-27 NOTE — PLAN OF CARE
Problem: Breastfeeding  Goal: Effective Breastfeeding  Outcome: Ongoing, Progressing  Intervention: Support Exclusive Breastfeeding Success  Flowsheets (Taken 9/27/2023 6308)  Supportive Measures:   counseling provided   active listening utilized   verbalization of feelings encouraged   self-care encouraged  Breastfeeding Support:   maternal rest encouraged   infant-mother separation minimized

## 2023-09-27 NOTE — PROGRESS NOTES
Post Vaginal Delivery Progress Note:     Subjective  Ebonie Fall is a 22 y.o.   s/p , PPD#1. Patient resting comfortably in bed. Pain is well controlled. Lochia similar to her menses. Tolerating regular diet without nausea/vomiting. Ambulating comfortably, voiding spontaneously, and passing flatus.  Infant at bedside. Patient will be going for BTL today.    Denies headache, blurry vision, dizziness, chest pain, shortness of breath, RUQ pain, or calf pain. .    Objective:     Vitals:    23 1715 23 1943 23 2347 23 0731   BP: 124/70 114/69 104/68 115/80   BP Location:       Pulse: 65 81 67 66   Resp:    18   Temp: 98.6 °F (37 °C) 97.7 °F (36.5 °C) 98.4 °F (36.9 °C) 98 °F (36.7 °C)   TempSrc:    Oral   SpO2:       Weight:       Height:           General:  Alert and oriented, in no acute distress   Lungs:  Clear to auscultation bilaterally   Heart::  Regular rate and rhythm   Abdomen:   Soft, nondistended, normoactive bowel sounds    Pelvic:  Scant blood present on pad    Uterine Fundus:   Firm, below the umbilicus    Extremities:  No cords, erythema, warmth, or tenderness to palpation in bilateral lower extremities; trace edema      Labs  Recent Results (from the past 24 hour(s))   Type & Screen    Collection Time: 23 10:24 AM   Result Value Ref Range    Group & Rh O POS     Indirect Phillip GEL NEG     Specimen Outdate 2023 23:59    SYPHILIS ANTIBODY (WITH REFLEX RPR)    Collection Time: 23 10:24 AM   Result Value Ref Range    Syphilis Antibody Nonreactive Nonreactive, Equivocal   CBC with Differential    Collection Time: 23 10:24 AM   Result Value Ref Range    WBC 7.23 4.50 - 11.50 x10(3)/mcL    RBC 4.67 4.20 - 5.40 x10(6)/mcL    Hgb 13.3 12.0 - 16.0 g/dL    Hct 41.0 37.0 - 47.0 %    MCV 87.8 80.0 - 94.0 fL    MCH 28.5 27.0 - 31.0 pg    MCHC 32.4 (L) 33.0 - 36.0 g/dL    RDW 14.3 11.5 - 17.0 %    Platelet 347 130 - 400 x10(3)/mcL    MPV 9.1 7.4 - 10.4 fL     Neut % 73.5 %    Lymph % 17.6 %    Mono % 7.2 %    Eos % 1.0 %    Basophil % 0.3 %    Lymph # 1.27 0.6 - 4.6 x10(3)/mcL    Neut # 5.32 2.1 - 9.2 x10(3)/mcL    Mono # 0.52 0.1 - 1.3 x10(3)/mcL    Eos # 0.07 0 - 0.9 x10(3)/mcL    Baso # 0.02 <=0.2 x10(3)/mcL    IG# 0.03 0 - 0.04 x10(3)/mcL    IG% 0.4 %    NRBC% 0.0 %   Urinalysis, Reflex to Urine Culture    Collection Time: 09/26/23  1:47 PM    Specimen: Urine, Catheterized   Result Value Ref Range    Color, UA Yellow Yellow, Light-Yellow, Dark Yellow, Dulce, Straw    Appearance, UA Clear Clear    Specific Gravity, UA 1.020 1.005 - 1.030    pH, UA 7.5 5.0 - 8.5    Protein, UA Negative Negative    Glucose, UA Negative Negative, Normal    Ketones, UA Negative Negative    Blood, UA Negative Negative    Bilirubin, UA Negative Negative    Urobilinogen, UA 1.0 0.2, 1.0, Normal    Nitrites, UA Negative Negative    Leukocyte Esterase, UA Negative Negative   Drug Screen, Urine    Collection Time: 09/26/23  1:47 PM   Result Value Ref Range    Amphetamines, Urine Negative Negative    Barbituates, Urine Negative Negative    Benzodiazepine, Urine Negative Negative    Cannabinoids, Urine Negative Negative    Cocaine, Urine Negative Negative    Fentanyl, Urine Negative Negative    MDMA, Urine Negative Negative    Opiates, Urine Negative Negative    Phencyclidine, Urine Negative Negative    pH, Urine 7.5 3.0 - 11.0   Urinalysis, Microscopic    Collection Time: 09/26/23  1:47 PM   Result Value Ref Range    RBC, UA <5 <=5 /HPF    WBC, UA <5 <=5 /HPF    Squamous Epithelial Cells, UA 7 (H) <=5 /HPF    Bacteria, UA None Seen None Seen, Rare, Occasional /HPF       Trended Lab Data:  Recent Labs   Lab 09/26/23  1024   WBC 7.23   HGB 13.3      MCV 87.8       Medications   ibuprofen  600 mg Oral Q6H    lactated ringers  1,000 mL Intravenous Once    oxytocin in lactated ringers  334 jesús-units/min Intravenous Once    oxytocin in lactated ringers  95 jesús-units/min Intravenous Once     oxytocin in lactated ringers  95 jesús-units/min Intravenous Once    prenatal vitamin  1 tablet Oral Daily    sodium citrate-citric acid 500-334 mg/5 ml  30 mL Oral Once      benzocaine-lanolin      fentanyl 2 mcg/mL with BUPivacaine 0.125% in sodium chloride 0.9% Epidural 10 mL/hr (23 1325)    lactated ringers 125 mL/hr at 23 1346    oxytocin in lactated ringers 4 jesús-units/min (23 1340)       Assessment/Plan  22 y.o.  s/p , PPD# 1. Clinically stable and doing well. Pregnancy/postpartum course complicated by:     Continue routine postpartum care.   Asymptomatic anemia:  Antepartum H/H 13.3/41.0 --> [id number]--> postpartum H/H Not obtained.   No signs/symptoms of anemia this AM, continue to monitor  Feeding:  Breastfeeding, continue to encourage.  Pain: scheduled ibuprofen and tylenol, PRN oxycodone for breakthrough pain.  PPBCM: To OR today 23 for Bilateral tubal ligation   To OR for BTL as above  Dispo: continue to monitor, anticipate discharge to home on postpartum day #2 if meeting all goals.    discussed patient and plan of care with Dr. Luciano Corral MD  [facility] HO-1

## 2023-09-27 NOTE — TRANSFER OF CARE
Anesthesia Transfer of Care Note    Patient: Ebonie Fall    Procedure(s) Performed: Procedure(s) (LRB):  LIGATION, FALLOPIAN TUBE (Bilateral)    Patient location: PACU    Anesthesia Type: general    Transport from OR: Transported from OR on room air with adequate spontaneous ventilation    Post pain: adequate analgesia    Post assessment: no apparent anesthetic complications    Post vital signs: stable    Level of consciousness: sedated    Nausea/Vomiting: no nausea/vomiting    Complications: none    Transfer of care protocol was followed      Last vitals:   Visit Vitals  /69   Pulse 61   Temp 36.8 °C (98.3 °F)   Resp 18   Ht 5' (1.524 m)   Wt 66.7 kg (147 lb)   LMP 12/22/2022 (Approximate)   SpO2 98%   Breastfeeding Unknown   BMI 28.71 kg/m²

## 2023-09-27 NOTE — LACTATION NOTE
""Cluster feeding" is normal; baby may nurse very often for several times in a row. This commonly occurs in the evening or early part of the night.   "

## 2023-09-27 NOTE — PLAN OF CARE
Problem: Breastfeeding  Goal: Effective Breastfeeding  Outcome: Ongoing, Progressing  Intervention: Promote Effective Breastfeeding  Flowsheets (Taken 9/27/2023 1330)  Breastfeeding Assistance:   feeding session observed   infant suck/swallow verified   support offered   infant latch-on verified  Parent/Child Attachment Promotion:   positive reinforcement provided   strengths emphasized  Intervention: Support Exclusive Breastfeeding Success  Flowsheets (Taken 9/27/2023 1330)  Breastfeeding Support:   diary/feeding log utilized   maternal hydration promoted   encouragement provided   maternal nutrition promoted   infant-mother separation minimized   maternal rest encouraged   lactation counseling provided

## 2023-09-27 NOTE — OP NOTE
DATE OF PROCEDURE: 2023    PREOPERATIVE DIAGNOSES:   1.  Normal pregnancy    2. Undesired fertility   3. S/p spontaneous vaginal delivery     POSTOPERATIVE DIAGNOSES:   1.  Normal pregnancy    2. Undesired fertility   3. S/p spontaneous vaginal delivery   4. S/p BTL       SURGEON : Dr. Irvin Wolf (Attending); Dr. Jayda Ivey, Dr. Abdirashid Corral     PROCEDURE: postpartum bilateral tubal ligation     ANESTHESIA: Spinal anesthesia     ESTIMATED BLOOD LOSS: minimal     UOP: None    INDICATIONS: Ms. Fall is a 23yo  female with s/p  with undesired fertility     FINDINGS: Normal uterus, and ovaries. R and L tube tied and cut and section sent to pathology.    PROCEDURE IN DETAIL: The patient was taken back to the Operating Room where anesthesia was found to be adequate. The patient was prepped and draped in normal sterile fashion in dorsal supine position. 2g Ancef given. A 4cm horizontal infraumbilical skin incision was made with the scalpel and carried down to the underlying fascia. Allis clamps were used for good exposure as well as [employer] retractors. The peritoneum was entered and the uterus identified. Patient was airplaned to the right and the left tube was identified, grasped with [place] clamps and walked out to the fimbriae which was identified. The tube was then brought to the level of the skin incision and grasped at the mid isthmic portion. A window was created in the mesosalpinx and curved hemostats were used to create vascular pedicles on both ends. Metzenbaums were then used to cut above the suture both proximally and distally. The cut portion of the tube was sent to Pathology. Cat gut suture was used to secure vascular pedicles. Excellent hemostasis was noted. The fallopian tube was then replaced into the abdomen.   Attention was then paid to the right tube which was ligated in the same fashion. The cut portion of the tube was sent to Pathology. The fallopian tube was then replaced into  the abdomen after ensuring complete hemostasis.   The fascia was then closed with 0 vicryl in a running fashion. Skin was then closed with 4-0 monocryl.   Sponge, lap, and needle counts were good x 2. The patient tolerated the procedure well and was transferred over to the recovery area in stable condition.      Jayda Ivey DO  [facility] PGY-2

## 2023-09-27 NOTE — ANESTHESIA PREPROCEDURE EVALUATION
09/27/2023  Ebonie Fall is a 22 y.o., female recently s/p delivery of IUP.  Undesired fertility.  For BTL today.      Pre-op Assessment    I have reviewed the Patient Summary Reports.     I have reviewed the Nursing Notes. I have reviewed the NPO Status.   I have reviewed the Medications.     Review of Systems  Anesthesia Hx:  No problems with previous Anesthesia  Denies Family Hx of Anesthesia complications.   Denies Personal Hx of Anesthesia complications.   Cardiovascular:  Cardiovascular Normal Exercise tolerance: good   Denies Angina.    Pulmonary:   Asthma        Physical Exam  General: Well nourished, Cooperative, Alert and Oriented    Airway:  Mallampati: II   Mouth Opening: Normal  TM Distance: Normal  Tongue: Normal  Neck ROM: Normal ROM    Dental:  Intact    Chest/Lungs:  Clear to auscultation, Normal Respiratory Rate    Heart:  Rate: Normal  Rhythm: Regular Rhythm        Anesthesia Plan  Type of Anesthesia, risks & benefits discussed:    Anesthesia Type: Spinal  Intra-op Monitoring Plan: Standard ASA Monitors  Post Op Pain Control Plan: multimodal analgesia  Informed Consent: Informed consent signed with the Patient and all parties understand the risks and agree with anesthesia plan.  All questions answered.   ASA Score: 2  Day of Surgery Review of History & Physical: H&P Update referred to the surgeon/provider.  Anesthesia Plan Notes: GETA as backup    Ready For Surgery From Anesthesia Perspective.     .

## 2023-09-27 NOTE — DISCHARGE INSTRUCTIONS
"[facility]        [phone]  Discharge Instructions    Watch for early feeding cues (rooting, hand to mouth, smacking lips, sticking out tongue). Offer the breast at the first signs of hunger. Crying is a late sign of hunger; don't wait until then.    Feed your baby at least 8-12 times in a 24-hour period. Feeding early and often will ensure a plentiful milk supply for you and your baby and will prevent engorgement in the coming days.  Do not limit or schedule feedings.    "Cluster feeding" is normal; baby may nurse very often for several times in a row. This commonly occurs in the evening or early part of the night.    Allow your baby to finish one side before offering the other. You can try to burp the baby and then offer the other breast if he/ she seems to still be hungry.     Skin to skin contact helps a sleepy baby want to nurse. Babies who are frequently held skin to skin nurse better and longer. Skin to skin increases mom's milk-making hormone levels as well. Skin to skin can help calm baby too.     By the end of the first week, you want to see 6-8 wet diapers per day and 3-5 yellow, seedy stools (stools will change from black to green to yellow by the end of the 1st week. Refer to chart in breastfeeding booklet to see how many wet/ dirty diapers baby should be having each day. Notify pediatrician if baby is not having enough wet and dirty diapers.    It is best to avoid bottles and pacifiers for the first 4 weeks while getting breastfeeding established.     Back to work or school: 4 weeks is a good time to start pumping after morning feeds in order to store milk for baby, although you may pump before if needed. Around 4-6 weeks is a good time to introduce a bottle of pumped milk to baby if you will go back to work or school.     You should feel a tugging or pulling sensation when your baby nurses; it should never feel sharp, pinching, or singing. If there is pain, try to adjust the latch. Make " sure your baby opens his mouth wide to latch on. His lips should be flanged out, like a fish. (You may want to refer to the handouts in your packet or view latch videos at [employer] or [employer].    Listen for swallowing. This indicates your baby is transferring that milk!     Your milk will increase between days 3-5. Frequent feeds can help with engorgement.     If your breasts begin to get engorged, place warm cloths on them or  a warm shower before feeding. This will help the milk begin to flow. Feed often to drain the breasts. After feeding, you may use cold packs for 10-15 minutes to reduce swelling. You may also want to pump for comfort; don't overdo it- just pump enough to relieve the fullness.     No soap or lotions to the nipples except for medical grade lanolin or nipple cream for soreness.     All babies go through growth spurts. The first one is generally around 2-3 weeks. If your baby starts to nurse a lot more than usual, this is likely the reason. Growth spurts happen every so often and usually last for 3-5 days.     Remember to check the safety of any medications, prescription or non-prescription (including herbals), before you take them. Your baby's pediatrician is the best one to confirm the safety of the medication while you are breastfeeding. You may also phone us. We can tell you about safety ratings that have been published regarding a particular medication. You may wish to phone the [facility] at [phone] to check the safety of a medication.     Call with any questions or concerns. Don't wait-- ask for help early. Breastfeeding Resources can be found on the last few pages of your Breastfeeding Booklet given to you in the hospital.

## 2023-09-27 NOTE — ANESTHESIA POSTPROCEDURE EVALUATION
Anesthesia Post Evaluation    Patient: Ebonie Fall    Procedure(s) Performed: * No procedures listed *    Final Anesthesia Type: epidural      Patient location during evaluation: med/surg floor  Patient participation: Yes- Able to Participate  Level of consciousness: awake and alert  Post-procedure vital signs: reviewed and stable  Pain management: adequate  Airway patency: patent      Anesthetic complications: no      Cardiovascular status: hemodynamically stable  Respiratory status: unassisted  Hydration status: euvolemic  Follow-up not needed.          Vitals Value Taken Time   /79 09/27/23 1130   Temp 36.5 °C (97.7 °F) 09/27/23 1100   Pulse 61 09/27/23 1130   Resp 18 09/27/23 1126   SpO2 98 % 09/27/23 1040         No case tracking events are documented in the log.      Pain/Danilo Score: Pain Rating Prior to Med Admin: 8 (9/27/2023 11:26 AM)  Danilo Score: 9 (9/27/2023 10:40 AM)

## 2023-09-28 VITALS
TEMPERATURE: 98 F | HEIGHT: 60 IN | HEART RATE: 68 BPM | BODY MASS INDEX: 28.86 KG/M2 | RESPIRATION RATE: 17 BRPM | SYSTOLIC BLOOD PRESSURE: 114 MMHG | DIASTOLIC BLOOD PRESSURE: 78 MMHG | WEIGHT: 147 LBS | OXYGEN SATURATION: 98 %

## 2023-09-28 LAB — PSYCHE PATHOLOGY RESULT: NORMAL

## 2023-09-28 PROCEDURE — 25000003 PHARM REV CODE 250

## 2023-09-28 RX ORDER — DOCUSATE SODIUM 100 MG/1
200 CAPSULE, LIQUID FILLED ORAL 2 TIMES DAILY PRN
Qty: 28 CAPSULE | Refills: 0 | Status: SHIPPED | OUTPATIENT
Start: 2023-09-28 | End: 2023-10-12

## 2023-09-28 RX ORDER — ACETAMINOPHEN 325 MG/1
650 TABLET ORAL EVERY 6 HOURS PRN
Qty: 40 TABLET | Refills: 0 | Status: SHIPPED | OUTPATIENT
Start: 2023-09-28 | End: 2023-10-03

## 2023-09-28 RX ORDER — IBUPROFEN 600 MG/1
600 TABLET ORAL EVERY 6 HOURS
Qty: 56 TABLET | Refills: 0 | Status: SHIPPED | OUTPATIENT
Start: 2023-09-28 | End: 2023-10-12

## 2023-09-28 RX ORDER — HYDROCODONE BITARTRATE AND ACETAMINOPHEN 5; 325 MG/1; MG/1
1 TABLET ORAL EVERY 6 HOURS PRN
Qty: 20 TABLET | Refills: 0 | Status: SHIPPED | OUTPATIENT
Start: 2023-09-28 | End: 2023-10-03

## 2023-09-28 RX ADMIN — PRENATAL VITAMINS-IRON FUMARATE 27 MG IRON-FOLIC ACID 0.8 MG TABLET 1 TABLET: at 08:09

## 2023-09-28 RX ADMIN — IBUPROFEN 600 MG: 600 TABLET ORAL at 12:09

## 2023-09-28 RX ADMIN — IBUPROFEN 600 MG: 600 TABLET ORAL at 06:09

## 2023-09-28 NOTE — DISCHARGE SUMMARY
Delivery Discharge Summary  Obstetrics      Primary OB Clinician: [facility] family medicine clinic    Admission date: 2023  Discharge date: 2023    Disposition: To home, self care    Discharge Diagnosis List:      Patient Active Problem List   Diagnosis    Cannabis abuse    Nondisplaced fracture of coronoid process of right ulna, subsequent encounter for closed fracture with routine healing     (spontaneous vaginal delivery)    S/P tubal ligation       Procedure:     Hospital Course:  Ebonie Fall is a 22 y.o. now , PPD #2 who was admitted on 2023 . Patient was subsequently admitted to labor and delivery unit with signed consents.     Labor course was uncomplicated and resulted in  without complications.     Please see delivery note for further details. Her postpartum course was uncomplicated. Patient underwent BTL on 23 with no complications. On discharge day, patient's pain is controlled with oral pain medications. Pt is tolerating ambulation without SOB or CP, and regular diet without N/V. Reports lochia is mild. Denies any HA, vision changes, F/C, LE swelling. Denies any breast pain/soreness.    Pt in stable condition and ready for discharge. She has been instructed to start and/or continue medications and follow up with her obstetrics provider as listed below.    Pertinent studies:  CBC  Recent Labs   Lab 23  1024   WBC 7.23   HGB 13.3   HCT 41.0   MCV 87.8           Exam:  General:  Alert and oriented, in no acute distress   Lungs:  Clear to auscultation bilaterally   Heart::  Regular rate and rhythm   Abdomen:   Soft, nondistended, normoactive bowel sounds, BTL incision c/d/i   Pelvic:  Scant blood present on pad    Uterine Fundus:   Firm, below the umbilicus    Extremities:  No cords, erythema, warmth, or tenderness to palpation in bilateral lower extremities; trace edema          Immunization History   Administered Date(s) Administered    DTaP 1999,  "2000, 2000, 2000, 2004    HIB 1999, 2000, 2000    HPV Quadrivalent 2009, 2010, 10/04/2013    Hepatitis A, Pediatric/Adolescent, 2 Dose 2015, 2017    Hepatitis B, Pediatric/Adolescent 1999, 2000, 2000    IPV 2000, 2000, 2004    Influenza - Quadrivalent - PF *Preferred* (6 months and older) 2010, 10/04/2013    Influenza A (H1N1) 2009 Monovalent - IM 2009    MMR 2000, 2004    Meningococcal Conjugate (MCV4P) 10/04/2013, 2017    OPV 1999, 2000    Tdap 10/04/2013, 2023    Varicella 2000, 2007        Delivery:    Episiotomy: None   Lacerations: None   Repair suture: None   Repair # of packets:     Blood loss (ml):       Birth information:  YOB: 2024   Time of birth: 2:46 PM   Sex: female   Delivery type: Vaginal, Spontaneous   Gestational Age: 40w3d     Measurements    Weight: 2820 g  Weight (lbs): 6 lb 3.5 oz  Length: 48.3 cm  Length (in): 19"  Head circumference: 32.4 cm         Delivery Clinician: Delivery Providers    Delivering clinician: Jayda Ivey MD   Provider Role    Abdirashid Corral MD Resident    Jaimee Hyde RN Pediatric Nursery    Jessica Daniel, RN Registered Nurse    Charley Strauss RN Registered Nurse    Rene Nunez, JINA Respiratory Therapist    Akua Doty RN Pediatric Nursery             Additional  information:  Forceps:    Vacuum:    Breech:    Observed anomalies      Living?:     Apgars    Living status: Living  Apgar Component Scores:  1 min.:  5 min.:  10 min.:  15 min.:  20 min.:    Skin color:  0  1       Heart rate:  2  2       Reflex irritability:  2  2       Muscle tone:  2  2       Respiratory effort:  2  2       Total:  8  9       Apgars assigned by: ESE DOTY RN         Placenta: Delivered:       appearance    Patient Instructions:   Current Discharge Medication List        START taking these medications    " Details   acetaminophen (TYLENOL) 325 MG tablet Take 2 tablets (650 mg total) by mouth every 6 (six) hours as needed for Pain.  Qty: 40 tablet, Refills: 0      docusate sodium (COLACE) 100 MG capsule Take 2 capsules (200 mg total) by mouth 2 (two) times daily as needed for Constipation.  Qty: 28 capsule, Refills: 0      HYDROcodone-acetaminophen (NORCO) 5-325 mg per tablet Take 1 tablet by mouth every 6 (six) hours as needed for Pain.  Qty: 20 tablet, Refills: 0    Comments: Quantity prescribed more than 7 day supply? No      ibuprofen (ADVIL,MOTRIN) 600 MG tablet Take 1 tablet (600 mg total) by mouth every 6 (six) hours. for 14 days  Qty: 56 tablet, Refills: 0           STOP taking these medications       SE--19 29 mg iron- 1 mg Tab Comments:   Reason for Stopping:               Discharge Procedure Orders   Diet Adult Regular     Notify your health care provider if you experience any of the following:  temperature >100.4     Notify your health care provider if you experience any of the following:  persistent nausea and vomiting or diarrhea     Notify your health care provider if you experience any of the following:  severe uncontrolled pain     Notify your health care provider if you experience any of the following:  redness, tenderness, or signs of infection (pain, swelling, redness, odor or green/yellow discharge around incision site)     Notify your health care provider if you experience any of the following:  difficulty breathing or increased cough     Notify your health care provider if you experience any of the following:  severe persistent headache     Notify your health care provider if you experience any of the following:  worsening rash     Notify your health care provider if you experience any of the following:  persistent dizziness, light-headedness, or visual disturbances     Notify your health care provider if you experience any of the following:  increased confusion or weakness     Notify your  health care provider if you experience any of the following:     No dressing needed     Activity as tolerated        Follow-up Information       Clinics, [facility] Schedule an appointment as soon as possible for a visit in 6 week(s).    Contact information:  [address] [facility] [facility] [address] [phone]               [facility]. Schedule an appointment as soon as possible for a visit in 6 week(s).    Specialty: Family Medicine  Why: For postpartum visit  Contact information:  [address] [facility] [address] [phone]                            Follow-up will be at [facility] family medicine clinic.  Follow-up will be in approximately 6 weeks.  ER precautions were reviewed with the patient and she was given opportunity to ask questions.  Stable for discharge

## 2023-09-28 NOTE — PLAN OF CARE
Problem:  Fall Injury Risk  Goal: Absence of Fall, Infant Drop and Related Injury  Outcome: Ongoing, Progressing     Problem: Adult Inpatient Plan of Care  Goal: Plan of Care Review  Outcome: Ongoing, Progressing  Goal: Patient-Specific Goal (Individualized)  Outcome: Ongoing, Progressing  Goal: Absence of Hospital-Acquired Illness or Injury  Outcome: Ongoing, Progressing  Goal: Optimal Comfort and Wellbeing  Outcome: Ongoing, Progressing  Goal: Readiness for Transition of Care  Outcome: Ongoing, Progressing     Problem: Bleeding (Labor)  Goal: Hemostasis  Outcome: Ongoing, Progressing     Problem: Breastfeeding  Goal: Effective Breastfeeding  Outcome: Ongoing, Progressing     Problem: Infection (Postpartum Vaginal Delivery)  Goal: Absence of Infection Signs/Symptoms  Outcome: Ongoing, Progressing     Problem: Urinary Retention (Postpartum Vaginal Delivery)  Goal: Effective Urinary Elimination  Outcome: Ongoing, Progressing

## 2023-09-28 NOTE — PLAN OF CARE
"  Problem:  Fall Injury Risk  Goal: Absence of Fall, Infant Drop and Related Injury  Outcome: Ongoing, Progressing     Problem: Infection  Goal: Absence of Infection Signs and Symptoms  Outcome: Ongoing, Progressing     Problem: Adult Inpatient Plan of Care  Goal: Plan of Care Review  Outcome: Ongoing, Progressing  Goal: Patient-Specific Goal (Individualized)  Outcome: Ongoing, Progressing  Flowsheets (Taken 2023)  Individualized Care Needs: "I want my pain under control"  Goal: Absence of Hospital-Acquired Illness or Injury  Outcome: Ongoing, Progressing  Goal: Optimal Comfort and Wellbeing  Outcome: Ongoing, Progressing  Goal: Readiness for Transition of Care  Outcome: Ongoing, Progressing     Problem: Bleeding (Labor)  Goal: Hemostasis  Outcome: Ongoing, Progressing     Problem: Infection (Labor)  Goal: Absence of Infection Signs and Symptoms  Outcome: Ongoing, Progressing     Problem: Breastfeeding  Goal: Effective Breastfeeding  Outcome: Ongoing, Progressing     Problem: Adjustment to Role Transition (Postpartum Vaginal Delivery)  Goal: Successful Maternal Role Transition  Outcome: Ongoing, Progressing     Problem: Bleeding (Postpartum Vaginal Delivery)  Goal: Hemostasis  Outcome: Ongoing, Progressing     Problem: Infection (Postpartum Vaginal Delivery)  Goal: Absence of Infection Signs/Symptoms  Outcome: Ongoing, Progressing     Problem: Pain (Postpartum Vaginal Delivery)  Goal: Acceptable Pain Control  Outcome: Ongoing, Progressing     Problem: Urinary Retention (Postpartum Vaginal Delivery)  Goal: Effective Urinary Elimination  Outcome: Ongoing, Progressing     "

## 2023-10-03 NOTE — ANESTHESIA POSTPROCEDURE EVALUATION
Anesthesia Post Evaluation    Patient: Ebonie Fall    Procedure(s) Performed: Procedure(s) (LRB):  LIGATION, FALLOPIAN TUBE (Bilateral)    Final Anesthesia Type: CSE      Patient location during evaluation: med/surg floor  Patient participation: Yes- Able to Participate  Level of consciousness: awake and alert  Post-procedure vital signs: reviewed and stable  Pain management: adequate  Airway patency: patent      Anesthetic complications: no      Cardiovascular status: hemodynamically stable  Respiratory status: unassisted  Hydration status: euvolemic  Follow-up not needed.          Vitals Value Taken Time   /78 09/28/23 0754   Temp 36.8 °C (98.3 °F) 09/28/23 0754   Pulse 68 09/28/23 0754   Resp 17 09/28/23 0754   SpO2 98 % 09/27/23 1040         Event Time   Out of Recovery 09/27/2023 10:40:00         Pain/Danilo Score: No data recorded

## 2024-04-27 ENCOUNTER — HOSPITAL ENCOUNTER (EMERGENCY)
Facility: HOSPITAL | Age: 24
Discharge: HOME | End: 2024-04-27
Attending: STUDENT IN AN ORGANIZED HEALTH CARE EDUCATION/TRAINING PROGRAM
Payer: MEDICAID

## 2024-04-27 VITALS
DIASTOLIC BLOOD PRESSURE: 66 MMHG | BODY MASS INDEX: 27.77 KG/M2 | WEIGHT: 150.88 LBS | OXYGEN SATURATION: 100 % | TEMPERATURE: 98 F | RESPIRATION RATE: 16 BRPM | SYSTOLIC BLOOD PRESSURE: 116 MMHG | HEART RATE: 78 BPM | HEIGHT: 62 IN

## 2024-04-27 DIAGNOSIS — J30.2 SEASONAL ALLERGIC RHINITIS, UNSPECIFIED TRIGGER: Primary | ICD-10-CM

## 2024-04-27 LAB
FLUAV AG UPPER RESP QL IA.RAPID: NOT DETECTED
FLUBV AG UPPER RESP QL IA.RAPID: NOT DETECTED
SARS-COV-2 RNA RESP QL NAA+PROBE: NOT DETECTED
STREP A PCR (OHS): NOT DETECTED

## 2024-04-27 PROCEDURE — 87651 STREP A DNA AMP PROBE: CPT | Performed by: PHYSICIAN ASSISTANT

## 2024-04-27 PROCEDURE — 0240U COVID/FLU A&B PCR: CPT | Performed by: PHYSICIAN ASSISTANT

## 2024-04-27 PROCEDURE — 99282 EMERGENCY DEPT VISIT SF MDM: CPT

## 2024-04-27 RX ORDER — FLUTICASONE PROPIONATE 50 MCG
1 SPRAY, SUSPENSION (ML) NASAL 2 TIMES DAILY PRN
Qty: 15 G | Refills: 0 | Status: SHIPPED | OUTPATIENT
Start: 2024-04-27

## 2024-04-27 RX ORDER — CETIRIZINE HYDROCHLORIDE 10 MG/1
10 TABLET ORAL DAILY
Qty: 30 TABLET | Refills: 0 | Status: SHIPPED | OUTPATIENT
Start: 2024-04-27 | End: 2025-04-27

## 2024-04-27 NOTE — DISCHARGE INSTRUCTIONS
Report to Emergency Department if symptoms return or worsen; [facility] Medicine Clinic Within 1 to 2 days, It is important that you follow up with your primary care provider or specialist if indicated for further evaluation, workup, and treatment as necessary. The exam and treatment you received in Emergency Department was for an urgent problem and NOT INTENDED AS COMPLETE CARE. It is important that you FOLLOW UP with a doctor for ongoing care. If your symptoms become WORSE or you DO NOT IMPROVE and you are unable to reach your health care provider, you should RETURN to the Emergency Department. The Emergency Department provider has provided a PRELIMINARY INTERPRETATION of all your studies. A final interpretation may be done after you are discharged. If a change in your diagnosis or treatment is needed WE WILL CONTACT YOU. It is critical that we have a CURRENT PHONE NUMBER FOR YOU.

## 2024-04-27 NOTE — ED PROVIDER NOTES
Encounter Date: 4/27/2024       History     Chief Complaint   Patient presents with    Cough     Pt c/o cough, nasal congestion x 5 days,      23-year-old female with past medical history significant for asthma and smoking presents to ED complaining of 5 day history of congestion, cough, scratchy throat and itchy ears.  Patient reports she has not tried taking any allergy medications.  Does report that she gets frequent strep throat and was concerned that that could be this.  Denies any known sick contacts prior to onset of symptoms.  Denies fever, chills, shortness of breath, wheezing, hemoptysis, dysphagia, stridor, drooling.  Vital signs stable on arrival, patient in no acute distress.      Review of patient's allergies indicates:  No Known Allergies  Past Medical History:   Diagnosis Date    Asthma      Past Surgical History:   Procedure Laterality Date    TUBAL LIGATION Bilateral 9/27/2023    Procedure: LIGATION, FALLOPIAN TUBE;  Surgeon: Irvin Wolf MD;  [facility];  Service: OB/GYN;  Laterality: Bilateral;     Family History   Problem Relation Name Age of Onset    Sickle cell anemia Father      Diabetes Father       Social History     Tobacco Use    Smoking status: Every Day     Types: Cigarettes, Vaping with nicotine    Smokeless tobacco: Never    Tobacco comments:     Quit x 3 wks ago.   Substance Use Topics    Alcohol use: Never    Drug use: Never     Review of Systems   All other systems reviewed and are negative.      Physical Exam     Initial Vitals [04/27/24 1144]   BP Pulse Resp Temp SpO2   116/66 78 16 98.3 °F (36.8 °C) 100 %      MAP       --         Physical Exam    Nursing note and vitals reviewed.  Constitutional: She appears well-developed and well-nourished. She is not diaphoretic. No distress.   HENT:   Head: Normocephalic and atraumatic.   Right Ear: Hearing, external ear and ear canal normal. Tympanic membrane is not erythematous and not bulging. A middle ear effusion is  present.   Left Ear: External ear and ear canal normal. Tympanic membrane is not erythematous and not bulging. A middle ear effusion is present.   Nose: Mucosal edema present. No rhinorrhea.   Mouth/Throat: Uvula is midline and mucous membranes are normal. No oral lesions. No trismus in the jaw. No uvula swelling. Posterior oropharyngeal erythema present. No oropharyngeal exudate, posterior oropharyngeal edema or tonsillar abscesses.   Eyes: Conjunctivae and EOM are normal. Pupils are equal, round, and reactive to light.   Neck: Neck supple.   Normal range of motion.  Cardiovascular:  Normal rate, regular rhythm, normal heart sounds and intact distal pulses.     Exam reveals no gallop and no friction rub.       No murmur heard.  Pulmonary/Chest: Breath sounds normal. No stridor. No respiratory distress. She has no wheezes. She has no rhonchi. She has no rales.   Abdominal: Abdomen is soft. Bowel sounds are normal. She exhibits no distension. There is no abdominal tenderness. There is no rebound and no guarding.   Musculoskeletal:         General: No tenderness or edema. Normal range of motion.      Cervical back: Normal range of motion and neck supple.     Lymphadenopathy:     She has no cervical adenopathy.   Neurological: She is alert and oriented to person, place, and time. GCS score is 15. GCS eye subscore is 4. GCS verbal subscore is 5. GCS motor subscore is 6.   Skin: Skin is warm and dry. Capillary refill takes less than 2 seconds. No rash noted. No pallor.   Psychiatric: She has a normal mood and affect. Thought content normal.         ED Course   Procedures  Labs Reviewed   COVID/FLU A&B PCR - Normal    Narrative:     The Xpert Xpress SARS-CoV-2/FLU/RSV plus is a rapid, multiplexed real-time PCR test intended for the simultaneous qualitative detection and differentiation of SARS-CoV-2, Influenza A, Influenza B, and respiratory syncytial virus (RSV) viral RNA in either nasopharyngeal swab or nasal swab  specimens.         STREP GROUP A BY PCR - Normal    Narrative:     The Xpert Xpress Strep A test is a rapid, qualitative in vitro diagnostic test for the detection of Streptococcus pyogenes (Group A ß-hemolytic Streptococcus, Strep A) in throat swab specimens from patients with signs and symptoms of pharyngitis.            Imaging Results    None          Medications - No data to display  Medical Decision Making  Differential diagnosis: Includes but not limited to allergic rhinitis, COVID, flu, strep throat    ED management:  No indication for acute intervention in ED at this time.      ED course:  COVID, flu and strep swabs all negative.  Patient is nontoxic appearing with unremarkable vitals and no signs of respiratory distress or impending airway compromise, stable for discharge.  Presentation consistent with allergic rhinitis.  I will send patient home with medications for symptom relief.  Referral placed to Internal Medicine Clinic for follow-up.  Strict ED precautions given for new or worsening symptoms and patient verbalized understanding.  All test results explained and all questions answered.    Amount and/or Complexity of Data Reviewed  Labs: ordered. Decision-making details documented in ED Course.                                      Clinical Impression:  Final diagnoses:  [J30.2] Seasonal allergic rhinitis, unspecified trigger (Primary)          ED Disposition Condition    Discharge Good          ED Prescriptions       Medication Sig Dispense Start Date End Date Auth. Provider    fluticasone propionate (FLONASE) 50 mcg/actuation nasal spray 1 spray (50 mcg total) by Each Nostril route 2 (two) times daily as needed for Rhinitis or Allergies. 15 g 4/27/2024 -- Chet Veliz PA    cetirizine (ZYRTEC) 10 MG tablet Take 1 tablet (10 mg total) by mouth once daily. 30 tablet 4/27/2024 4/27/2025 Chet Veliz PA          Follow-up Information       Follow up With Specialties Details Why Contact Info  Additional Information    [facility] Internal Medicine In 2 weeks  [address] [facility] [address] [phone] Internal Medicine Clinic Entrance #1    [facility] Emergency [facility] Emergency Medicine  As needed, If symptoms worsen [address] [facility] [address] [phone]              Chet Veliz PA  04/27/24 6202

## 2024-06-26 ENCOUNTER — HOSPITAL ENCOUNTER (EMERGENCY)
Facility: HOSPITAL | Age: 24
Discharge: HOME | End: 2024-06-26
Attending: STUDENT IN AN ORGANIZED HEALTH CARE EDUCATION/TRAINING PROGRAM
Payer: MEDICAID

## 2024-06-26 VITALS
BODY MASS INDEX: 28.65 KG/M2 | WEIGHT: 145.94 LBS | RESPIRATION RATE: 16 BRPM | TEMPERATURE: 99 F | SYSTOLIC BLOOD PRESSURE: 153 MMHG | HEART RATE: 85 BPM | OXYGEN SATURATION: 97 % | DIASTOLIC BLOOD PRESSURE: 81 MMHG | HEIGHT: 60 IN

## 2024-06-26 DIAGNOSIS — K04.7 DENTAL ABSCESS: Primary | ICD-10-CM

## 2024-06-26 LAB
B-HCG UR QL: NEGATIVE
CTP QC/QA: YES

## 2024-06-26 PROCEDURE — 81025 URINE PREGNANCY TEST: CPT | Performed by: PHYSICIAN ASSISTANT

## 2024-06-26 PROCEDURE — 96372 THER/PROPH/DIAG INJ SC/IM: CPT | Performed by: PHYSICIAN ASSISTANT

## 2024-06-26 PROCEDURE — 99284 EMERGENCY DEPT VISIT MOD MDM: CPT | Mod: 25

## 2024-06-26 PROCEDURE — 63600175 PHARM REV CODE 636 W HCPCS: Performed by: PHYSICIAN ASSISTANT

## 2024-06-26 RX ORDER — AMOXICILLIN 875 MG/1
875 TABLET, FILM COATED ORAL 2 TIMES DAILY
Qty: 14 TABLET | Refills: 0 | Status: SHIPPED | OUTPATIENT
Start: 2024-06-26

## 2024-06-26 RX ORDER — HYDROCODONE BITARTRATE AND ACETAMINOPHEN 5; 325 MG/1; MG/1
1 TABLET ORAL EVERY 6 HOURS PRN
Qty: 12 TABLET | Refills: 0 | Status: SHIPPED | OUTPATIENT
Start: 2024-06-26 | End: 2024-06-29

## 2024-06-26 RX ORDER — KETOROLAC TROMETHAMINE 30 MG/ML
30 INJECTION, SOLUTION INTRAMUSCULAR; INTRAVENOUS
Status: COMPLETED | OUTPATIENT
Start: 2024-06-26 | End: 2024-06-26

## 2024-06-26 RX ADMIN — KETOROLAC TROMETHAMINE 30 MG: 30 INJECTION, SOLUTION INTRAMUSCULAR; INTRAVENOUS at 03:06

## 2024-06-26 NOTE — ED PROVIDER NOTES
"Encounter Date: 6/26/2024       History     Chief Complaint   Patient presents with    Dental Pain     Left lower dental pain and decay; 3 days. Needs dental resource list. Has been taking family members' medications, Ibuprofen, Aleve, Advil, and Amoxicillin.     24 yo female presents to ED with dental pain that started 3 days ago. Patient states her daughter accidentally hit her with a toy in the face and the left lower molar broke. Patient states at the time she had no pain. Now, patient states she noticed a "lump" on the gums and significant pain associated 3 days ago. Patient states she called 1 dentist that could not see her until Monday. Patient states she is unable to do anything due to the pain and is unable to eat due to the pain. Denies fever, chills.     The history is provided by the patient. No  was used.     Review of patient's allergies indicates:  No Known Allergies  Past Medical History:   Diagnosis Date    Asthma      Past Surgical History:   Procedure Laterality Date    TUBAL LIGATION Bilateral 9/27/2023    Procedure: LIGATION, FALLOPIAN TUBE;  Surgeon: Irvin Wolf MD;  Location: [facility]&;  Service: OB/GYN;  Laterality: Bilateral;     Family History   Problem Relation Name Age of Onset    Sickle cell anemia Father      Diabetes Father       Social History     Tobacco Use    Smoking status: Every Day     Types: Cigarettes, Vaping with nicotine    Smokeless tobacco: Never    Tobacco comments:     Quit x 3 wks ago.   Substance Use Topics    Alcohol use: Never    Drug use: Never     Review of Systems   Constitutional:  Negative for chills, fatigue and fever.   HENT:  Positive for dental problem. Negative for congestion, ear pain, sinus pain and sore throat.    Eyes:  Negative for pain.   Respiratory:  Negative for cough, chest tightness and shortness of breath.    Cardiovascular:  Negative for chest pain.   Gastrointestinal:  Negative for abdominal pain, constipation, " diarrhea, nausea and vomiting.   Genitourinary:  Negative for dysuria.   Musculoskeletal:  Negative for back pain and joint swelling.   Skin:  Negative for color change and rash.   Neurological:  Negative for dizziness and weakness.   Psychiatric/Behavioral:  Negative for behavioral problems and confusion.    All other systems reviewed and are negative.      Physical Exam     Initial Vitals [06/26/24 1510]   BP Pulse Resp Temp SpO2   (!) 153/81 85 16 98.9 °F (37.2 °C) 97 %      MAP       --         Physical Exam    Nursing note and vitals reviewed.  Constitutional: She appears well-developed and well-nourished.   HENT:   Head: Normocephalic and atraumatic.   Nose: Nose normal.   Mouth/Throat: Uvula is midline, oropharynx is clear and moist and mucous membranes are normal. Abnormal dentition. Dental abscesses present.       Tooth is painful to palpation. Abscess present surrounding lateral aspect of tooth. Tooth is broken and deformed.    Eyes: EOM are normal. Pupils are equal, round, and reactive to light.   Neck: Neck supple. No thyromegaly present. No JVD present.   Normal range of motion.  Cardiovascular:  Normal rate, regular rhythm, normal heart sounds and intact distal pulses.           No murmur heard.  Pulmonary/Chest: Breath sounds normal. No respiratory distress. She has no wheezes. She has no rhonchi. She has no rales. She exhibits no tenderness.   Abdominal: Abdomen is soft. Bowel sounds are normal. She exhibits no distension. There is no abdominal tenderness. There is no rebound and no guarding.   Musculoskeletal:         General: No tenderness or edema. Normal range of motion.      Cervical back: Normal range of motion and neck supple.     Lymphadenopathy:     She has no cervical adenopathy.   Neurological: She is alert and oriented to person, place, and time.   Skin: Skin is warm and dry. Capillary refill takes less than 2 seconds.   Psychiatric: She has a normal mood and affect. Thought content  "normal.         ED Course   Procedures  Labs Reviewed   POCT URINE PREGNANCY          Imaging Results    None          Medications   ketorolac injection 30 mg (30 mg Intramuscular Given 6/26/24 2929)     Medical Decision Making  24 yo female presents to ED with dental pain that started 3 days ago. Patient states her daughter accidentally hit her with a toy in the face and the left lower molar broke. Patient states at the time she had no pain. Now, patient states she noticed a "lump" on the gums and significant pain associated 3 days ago. Patient states she called 1 dentist that could not see her until Monday. Patient states she is unable to do anything due to the pain and is unable to eat due to the pain. Denies fever, chills.     DDX: impacted wisdom tooth, dental abscess, dental infection, among others     Hospital course: Patient seen and examined. Physical exam consistent with dental abscess. Toradol given in ED. Patient stable for DC home with norco and amoxicillin.     The patient was initially evaluated by the PA student under my direct supervision. I performed a face to face with the patient for my independent evaluation and management. Part of the note was the completed by the PA student under my direct supervision. The information documented is accurate and correct.                                         Clinical Impression:  Final diagnoses:  [K04.7] Dental abscess (Primary)          ED Disposition Condition    Discharge Stable          ED Prescriptions       Medication Sig Dispense Start Date End Date Auth. Provider    HYDROcodone-acetaminophen (NORCO) 5-325 mg per tablet Take 1 tablet by mouth every 6 (six) hours as needed for Pain. 12 tablet 6/26/2024 6/29/2024 Kelsi Lockhart PA-C    amoxicillin (AMOXIL) 875 MG tablet Take 1 tablet (875 mg total) by mouth 2 (two) times daily. 14 tablet 6/26/2024 -- Kelsi Lockhart PA-C          Follow-up Information       Follow up With Specialties " Details Why Contact Info    [facility] Emergency Medicine In 3 days As needed, If symptoms worsen [address] [facility] [address] [phone]    [facility]  In 1 week  [address] [id number]             Kelsi Lockhart PA-C  06/26/24 4387

## 2024-10-17 ENCOUNTER — HOSPITAL ENCOUNTER (EMERGENCY)
Facility: HOSPITAL | Age: 24
Discharge: HOME | End: 2024-10-17
Attending: EMERGENCY MEDICINE
Payer: COMMERCIAL

## 2024-10-17 VITALS
RESPIRATION RATE: 20 BRPM | WEIGHT: 143 LBS | HEIGHT: 60 IN | BODY MASS INDEX: 28.07 KG/M2 | SYSTOLIC BLOOD PRESSURE: 120 MMHG | DIASTOLIC BLOOD PRESSURE: 74 MMHG | HEART RATE: 66 BPM | OXYGEN SATURATION: 100 % | TEMPERATURE: 98 F

## 2024-10-17 DIAGNOSIS — S29.019A STRAIN OF THORACIC REGION, INITIAL ENCOUNTER: ICD-10-CM

## 2024-10-17 DIAGNOSIS — S39.012A STRAIN OF LUMBAR REGION, INITIAL ENCOUNTER: ICD-10-CM
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
## 2024-10-17 LAB
B-HCG UR QL: NEGATIVE
CTP QC/QA: YES

## 2024-10-17 PROCEDURE — 99284 EMERGENCY DEPT VISIT MOD MDM: CPT | Mod: 25

## 2024-10-17 PROCEDURE — 63600175 PHARM REV CODE 636 W HCPCS: Performed by: PHYSICIAN ASSISTANT

## 2024-10-17 PROCEDURE — 96372 THER/PROPH/DIAG INJ SC/IM: CPT | Performed by: PHYSICIAN ASSISTANT

## 2024-10-17 PROCEDURE — 81025 URINE PREGNANCY TEST: CPT | Performed by: PHYSICIAN ASSISTANT

## 2024-10-17 PROCEDURE — 25000003 PHARM REV CODE 250: Performed by: PHYSICIAN ASSISTANT

## 2024-10-17 RX ORDER — METHOCARBAMOL 500 MG/1
500 TABLET, FILM COATED ORAL
Status: COMPLETED | OUTPATIENT
Start: 2024-10-17 | End: 2024-10-17

## 2024-10-17 RX ORDER — DICLOFENAC SODIUM 50 MG/1
50 TABLET, DELAYED RELEASE ORAL 2 TIMES DAILY PRN
Qty: 20 TABLET | Refills: 0 | Status: SHIPPED | OUTPATIENT
Start: 2024-10-17

## 2024-10-17 RX ORDER — KETOROLAC TROMETHAMINE 30 MG/ML
30 INJECTION, SOLUTION INTRAMUSCULAR; INTRAVENOUS
Status: COMPLETED | OUTPATIENT
Start: 2024-10-17 | End: 2024-10-17

## 2024-10-17 RX ORDER — METHOCARBAMOL 500 MG/1
500 TABLET, FILM COATED ORAL 3 TIMES DAILY PRN
Qty: 15 TABLET | Refills: 0 | Status: SHIPPED | OUTPATIENT
Start: 2024-10-17 | End: 2024-10-22

## 2024-10-17 RX ADMIN — KETOROLAC TROMETHAMINE 30 MG: 30 INJECTION, SOLUTION INTRAMUSCULAR; INTRAVENOUS at 09:10

## 2024-10-17 RX ADMIN — METHOCARBAMOL 500 MG: 500 TABLET ORAL at 09:10
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
## 2025-05-12 ENCOUNTER — HOSPITAL ENCOUNTER (EMERGENCY)
Facility: HOSPITAL | Age: 25
Discharge: HOME | End: 2025-05-12
Attending: INTERNAL MEDICINE
Payer: MEDICAID

## 2025-05-12 VITALS
HEART RATE: 77 BPM | SYSTOLIC BLOOD PRESSURE: 112 MMHG | RESPIRATION RATE: 18 BRPM | HEIGHT: 60 IN | BODY MASS INDEX: 26.93 KG/M2 | DIASTOLIC BLOOD PRESSURE: 64 MMHG | OXYGEN SATURATION: 100 % | TEMPERATURE: 98 F | WEIGHT: 137.19 LBS

## 2025-05-12 DIAGNOSIS — M54.50 BILATERAL LOW BACK PAIN WITHOUT SCIATICA, UNSPECIFIED CHRONICITY: ICD-10-CM

## 2025-05-12 DIAGNOSIS — J06.9 UPPER RESPIRATORY TRACT INFECTION, UNSPECIFIED TYPE: Primary | ICD-10-CM

## 2025-05-12 LAB
B-HCG UR QL: NEGATIVE
CTP QC/QA: YES
FLUAV AG UPPER RESP QL IA.RAPID: NOT DETECTED
FLUBV AG UPPER RESP QL IA.RAPID: NOT DETECTED
SARS-COV-2 RNA RESP QL NAA+PROBE: NOT DETECTED
STREP A PCR (OHS): NOT DETECTED

## 2025-05-12 PROCEDURE — 96372 THER/PROPH/DIAG INJ SC/IM: CPT | Performed by: NURSE PRACTITIONER

## 2025-05-12 PROCEDURE — 63600175 PHARM REV CODE 636 W HCPCS: Mod: JZ,TB | Performed by: NURSE PRACTITIONER

## 2025-05-12 PROCEDURE — 81025 URINE PREGNANCY TEST: CPT | Performed by: NURSE PRACTITIONER

## 2025-05-12 PROCEDURE — 87651 STREP A DNA AMP PROBE: CPT | Performed by: NURSE PRACTITIONER

## 2025-05-12 PROCEDURE — 0240U COVID/FLU A&B PCR: CPT | Performed by: NURSE PRACTITIONER

## 2025-05-12 PROCEDURE — 99284 EMERGENCY DEPT VISIT MOD MDM: CPT | Mod: 25

## 2025-05-12 RX ORDER — CETIRIZINE HYDROCHLORIDE 10 MG/1
10 TABLET ORAL DAILY
Qty: 14 TABLET | Refills: 0 | Status: SHIPPED | OUTPATIENT
Start: 2025-05-12 | End: 2025-05-26

## 2025-05-12 RX ORDER — KETOROLAC TROMETHAMINE 30 MG/ML
30 INJECTION, SOLUTION INTRAMUSCULAR; INTRAVENOUS
Status: COMPLETED | OUTPATIENT
Start: 2025-05-12 | End: 2025-05-12

## 2025-05-12 RX ORDER — INDOMETHACIN 25 MG/1
25 CAPSULE ORAL 2 TIMES DAILY PRN
Qty: 14 CAPSULE | Refills: 0 | Status: SHIPPED | OUTPATIENT
Start: 2025-05-12

## 2025-05-12 RX ORDER — BENZONATATE 100 MG/1
100 CAPSULE ORAL 3 TIMES DAILY PRN
Qty: 30 CAPSULE | Refills: 0 | Status: SHIPPED | OUTPATIENT
Start: 2025-05-12 | End: 2025-05-22

## 2025-05-12 RX ORDER — BACLOFEN 10 MG/1
10 TABLET ORAL 3 TIMES DAILY PRN
Qty: 21 TABLET | Refills: 0 | Status: SHIPPED | OUTPATIENT
Start: 2025-05-12

## 2025-05-12 RX ORDER — KETOROLAC TROMETHAMINE 10 MG/1
10 TABLET, FILM COATED ORAL
Status: DISCONTINUED | OUTPATIENT
Start: 2025-05-12 | End: 2025-05-12

## 2025-05-12 RX ADMIN — KETOROLAC TROMETHAMINE 30 MG: 60 INJECTION, SOLUTION INTRAMUSCULAR at 10:05

## 2025-05-12 NOTE — DISCHARGE INSTRUCTIONS
Follow up with PCP in 5-7 days for additional evaluation.  Warm compresses to affected areas and soak in Epsom Salt for comfort.  Take pain medication as directed for up to 7 days.  Do not operate heavy machinery or moving vehicles within 30 minutes of taking muscle relaxers.  Increase oral fluid intake.

## 2025-05-12 NOTE — ED PROVIDER NOTES
"Encounter Date: 5/12/2025       History     Chief Complaint   Patient presents with    Cough     Cough and left upper back pain x 2 days; denies injury.      Patient is a 24-year-old female presents for evaluation after experiencing lower back pain for the last 3 days.  Reports attempting to "crack" her back and having other individuals attempt this as well with no improvement in pain symptoms.  Denies direct trauma to back, chest pain, shortness of breath, fever, abdominal pain, or loss of bowel or bladder control.  Says that she also has been experiencing a sore throat as well as a cough for the same time frame.  Denies relief with over-the-counter medications.  Chronic medical conditions include asthma.  Patient denies pain with urination, urinary frequency, vaginal bleeding, or vaginal discharge.      Review of patient's allergies indicates:  No Known Allergies  Past Medical History:   Diagnosis Date    Asthma      Past Surgical History:   Procedure Laterality Date    TUBAL LIGATION Bilateral 9/27/2023    Procedure: LIGATION, FALLOPIAN TUBE;  Surgeon: Irvin Wolf MD;  Location: [facility];  Service: OB/GYN;  Laterality: Bilateral;     Family History   Problem Relation Name Age of Onset    Sickle cell anemia Father      Diabetes Father       Social History[1]  Review of Systems   Constitutional:  Negative for chills, diaphoresis, fatigue and fever.   HENT:  Positive for sore throat. Negative for facial swelling, rhinorrhea, sinus pressure, sinus pain and trouble swallowing.    Respiratory:  Positive for cough. Negative for chest tightness, shortness of breath and wheezing.    Cardiovascular:  Negative for chest pain, palpitations and leg swelling.   Gastrointestinal:  Negative for abdominal pain, diarrhea, nausea and vomiting.   Genitourinary:  Negative for dysuria, flank pain, frequency, hematuria and urgency.   Musculoskeletal:  Positive for back pain. Negative for arthralgias, joint swelling and myalgias. "   Skin:  Negative for color change and rash.   Neurological:  Negative for dizziness, syncope, weakness and light-headedness.   Hematological:  Does not bruise/bleed easily.   All other systems reviewed and are negative.      Physical Exam     Initial Vitals [05/12/25 0941]   BP Pulse Resp Temp SpO2   112/64 77 18 98.2 °F (36.8 °C) 100 %      MAP       --         Physical Exam    Nursing note and vitals reviewed.  Constitutional: She appears well-developed and well-nourished.   HENT:   Head: Normocephalic and atraumatic.   Nose: Nose normal. Mouth/Throat: Posterior oropharyngeal erythema present. No oropharyngeal exudate.   Eyes: Conjunctivae and EOM are normal. Pupils are equal, round, and reactive to light.   Neck: Neck supple.   Normal range of motion.  Cardiovascular:  Normal rate, regular rhythm, normal heart sounds and intact distal pulses.           Pulmonary/Chest: Effort normal and breath sounds normal. No respiratory distress. She has no wheezes. She has no rhonchi. She has no rales. She exhibits no tenderness.   Dry cough present.     Abdominal: Abdomen is soft and flat. Bowel sounds are normal. She exhibits no distension. There is no abdominal tenderness. There is no rebound, no guarding, no tenderness at McBurney's point and negative Acosta's sign. negative psoas sign  Musculoskeletal:         General: Normal range of motion.      Cervical back: Normal range of motion and neck supple.      Lumbar back: Spasms and tenderness present. No swelling, edema or deformity.        Back:      Neurological: She is alert and oriented to person, place, and time. She has normal strength and normal reflexes.   Skin: Skin is warm and dry. Capillary refill takes less than 2 seconds.   Psychiatric: She has a normal mood and affect. Her speech is normal and behavior is normal. Judgment and thought content normal.         ED Course   Procedures  Labs Reviewed   COVID/FLU A&B PCR - Normal       Result Value    Influenza A  PCR Not Detected      Influenza B PCR Not Detected      SARS-CoV-2 PCR Not Detected      Narrative:     The Xpert Xpress SARS-CoV-2/FLU/RSV plus is a rapid, multiplexed real-time PCR test intended for the simultaneous qualitative detection and differentiation of SARS-CoV-2, Influenza A, Influenza B, and respiratory syncytial virus (RSV) viral RNA in either nasopharyngeal swab or nasal swab specimens.         STREP GROUP A BY PCR - Normal    STREP A PCR ([facility]) Not Detected      Narrative:     The Xpert Xpress Strep A test is a rapid, qualitative in vitro diagnostic test for the detection of Streptococcus pyogenes (Group A ß-hemolytic Streptococcus, Strep A) in throat swab specimens from patients with signs and symptoms of pharyngitis.     POCT URINE PREGNANCY    POC Preg Test, Ur Negative       Acceptable Yes            Imaging Results              X-Ray Lumbar Spine Ap And Lateral (Final result)  Result time 05/12/25 11:00:21      Final result by Stephanie Jett MD (05/12/25 11:00:21)                   Impression:      No acute abnormality identified.      Electronically signed by: Stephanie Jett  Date:    05/12/2025  Time:    11:00               Narrative:    EXAMINATION:  XR LUMBAR SPINE AP AND LATERAL    CLINICAL HISTORY:  low back pain;    COMPARISON:  X-rays dated 10/17/2024    FINDINGS:  The lowest fully formed disc space is labeled as L5-S1.  Alignment is normal.  The vertebral body heights and disc spaces are maintained.  The soft tissues are unremarkable.                                       Medications   ketorolac injection 30 mg (30 mg Intramuscular Given 5/12/25 1008)     Medical Decision Making  Differential:   Muscle strain   Degenerative disc disease   Fracture   URI   COVID   Influenza   Strep pharyngitis    Amount and/or Complexity of Data Reviewed  Labs: ordered.  Radiology: ordered.    Risk  Prescription drug management.               ED Course as of 05/12/25 1132   Mon May  12, 2025   1130 Given strict ED return precautions. I have spoken with the patient and/or caregivers. I have explained the patient's condition, diagnoses and treatment plan based on the information available to me at this time. I have answered the patient's and/or caregiver's questions and addressed any concerns. The patient and/or caregivers have as good an understanding of the patient's diagnosis, condition and treatment plan as can be expected at this point. The vital signs have been stable. The patient's condition is stable and appropriate for discharge from the emergency department.      The patient will pursue further outpatient evaluation with the primary care physician or other designated or consulting physician as outlined in the discharge instructions. The patient and/or caregivers are agreeable to this plan of care and follow-up instructions have been explained in detail. The patient and/or caregivers have received these instructions in written format and have expressed an understanding of the discharge instructions. The patient and/or caregivers are aware that any significant change in condition or worsening of symptoms should prompt an immediate return to this or the closest emergency department or a call to 911.   [JA]      ED Course User Index  [JA] Cooper Padilla Jr., P                       This chart is generated using a voice recognition system. Grammatical and content areas may inadvertently be generated in error. Please contact me if you find a perceive any inappropriate information in this chart. Thank you.       Clinical Impression:  Final diagnoses:  [J06.9] Upper respiratory tract infection, unspecified type (Primary)  [M54.50] Bilateral low back pain without sciatica, unspecified chronicity          ED Disposition Condition    Discharge Stable          ED Prescriptions       Medication Sig Dispense Start Date End Date Auth. Provider    cetirizine (ZYRTEC) 10 MG tablet Take 1 tablet (10  mg total) by mouth once daily. for 14 days 14 tablet 5/12/2025 5/26/2025 Cooper Padilla Jr., FNP    benzonatate (TESSALON) 100 MG capsule Take 1 capsule (100 mg total) by mouth 3 (three) times daily as needed for Cough (Cough). 30 capsule 5/12/2025 5/22/2025 Cooper Padilla Jr., FNP    baclofen (LIORESAL) 10 MG tablet Take 1 tablet (10 mg total) by mouth 3 (three) times daily as needed (muscle spasms). 21 tablet 5/12/2025 -- Cooper Padilla Jr., FNP    indomethacin (INDOCIN) 25 MG capsule Take 1 capsule (25 mg total) by mouth 2 (two) times daily as needed (pain). 14 capsule 5/12/2025 -- Cooper Padilla Jr., FNP          Follow-up Information       Follow up With Specialties Details Why Contact Info    [facility] Emergency Medicine In 3 days As needed, If symptoms worsen [address] [facility] [id number] [phone]                 [1]   Social History  Tobacco Use    Smoking status: Every Day     Types: Cigarettes, Vaping with nicotine    Smokeless tobacco: Never    Tobacco comments:     Quit x 3 wks ago.   Substance Use Topics    Alcohol use: Never    Drug use: Never        Cooper Padilla Jr., FNP  05/12/25 4119

## 2025-05-12 NOTE — Clinical Note
"Ebonie Craigprakash Fall was seen and treated in our emergency department on 5/12/2025.  She may return to work on 05/15/2025.       If you have any questions or concerns, please don't hesitate to call.      Cooper Padilla Jr., FNP"

## 2025-08-29 ENCOUNTER — OFFICE VISIT (OUTPATIENT)
Dept: URGENT CARE | Facility: CLINIC | Age: 25
End: 2025-08-29
Attending: INTERNAL MEDICINE
Payer: MEDICAID

## 2025-08-29 ENCOUNTER — HOSPITAL ENCOUNTER (EMERGENCY)
Facility: HOSPITAL | Age: 25
Discharge: HOME | End: 2025-08-29
Attending: INTERNAL MEDICINE
Payer: MEDICAID

## 2025-08-29 VITALS
WEIGHT: 125.88 LBS | HEART RATE: 93 BPM | OXYGEN SATURATION: 99 % | DIASTOLIC BLOOD PRESSURE: 71 MMHG | TEMPERATURE: 98 F | RESPIRATION RATE: 20 BRPM | SYSTOLIC BLOOD PRESSURE: 105 MMHG | BODY MASS INDEX: 24.59 KG/M2

## 2025-08-29 VITALS
OXYGEN SATURATION: 100 % | TEMPERATURE: 99 F | HEIGHT: 60 IN | RESPIRATION RATE: 20 BRPM | DIASTOLIC BLOOD PRESSURE: 67 MMHG | SYSTOLIC BLOOD PRESSURE: 101 MMHG | HEART RATE: 76 BPM | BODY MASS INDEX: 26.11 KG/M2 | WEIGHT: 133 LBS

## 2025-08-29 DIAGNOSIS — R09.89 SYMPTOMS OF UPPER RESPIRATORY INFECTION (URI): ICD-10-CM

## 2025-08-29 DIAGNOSIS — Z13.9 ENCOUNTER FOR MEDICAL SCREENING EXAMINATION: Primary | ICD-10-CM

## 2025-08-29 DIAGNOSIS — Z20.822 CLOSE EXPOSURE TO COVID-19 VIRUS: Primary | ICD-10-CM

## 2025-08-29 LAB
CTP QC/QA: YES
SARS-COV-2 RDRP RESP QL NAA+PROBE: NEGATIVE

## 2025-08-29 PROCEDURE — 99281 EMR DPT VST MAYX REQ PHY/QHP: CPT | Mod: 27

## 2025-08-29 PROCEDURE — 99214 OFFICE O/P EST MOD 30 MIN: CPT | Mod: PBBFAC

## (undated) DEVICE — SET HOTLINE 3 FLUID/BLD WARM

## (undated) DEVICE — MATTRESS HOVERMAT TRNSF 34X78